# Patient Record
Sex: FEMALE | Race: ASIAN | NOT HISPANIC OR LATINO | Employment: STUDENT | URBAN - METROPOLITAN AREA
[De-identification: names, ages, dates, MRNs, and addresses within clinical notes are randomized per-mention and may not be internally consistent; named-entity substitution may affect disease eponyms.]

---
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
## 2023-02-18 ENCOUNTER — OFFICE VISIT (OUTPATIENT)
Dept: URGENT CARE | Facility: CLINIC | Age: 18
End: 2023-02-18

## 2023-02-18 VITALS
RESPIRATION RATE: 16 BRPM | HEART RATE: 110 BPM | OXYGEN SATURATION: 99 % | HEIGHT: 61 IN | TEMPERATURE: 98.2 F | BODY MASS INDEX: 23.79 KG/M2 | WEIGHT: 126 LBS

## 2023-02-18 DIAGNOSIS — U07.1 COVID-19: Primary | ICD-10-CM

## 2023-02-18 LAB
SARS-COV-2 AG UPPER RESP QL IA: POSITIVE
VALID CONTROL: ABNORMAL

## 2023-02-18 NOTE — PROGRESS NOTES
[facility] [facility]        NAME: Carmen Jenkins is a 21 y o  female  : 2005    MRN: [id number]  DATE: 2023  TIME: 4:14 PM    Assessment and Plan   COVID-19 [U07 1]  1  COVID-19  Poct Covid 19 Rapid Antigen Test            Patient Instructions     Patient Instructions   Rapid COVID test positive  To continue over-the-counter cough and cold medication, adequate fluid hydration and rest   Discussed isolation/quarantine requirements  Follow up with PCP in 3-5 days  Proceed to  ER if symptoms worsen  Chief Complaint     Chief Complaint   Patient presents with   • Cold Like Symptoms     Pt reports cough, sore throat and congestion, 3x days         History of Present Illness       Patient is a 70-year-old female presenting today with cold symptoms x3 days  Patient notes over the last few days she has been experiencing some nasal congestion, postnasal drip and a cough, has been taking over-the-counter Robitussin which has provided some relief of her symptoms, denies any known sick contacts  Denies fever, chills, chest tightness, SOB, N/V/D  Review of Systems   Review of Systems   Constitutional: Negative for chills, fatigue and fever  HENT: Positive for congestion, postnasal drip and sore throat  Eyes: Negative for redness and itching  Respiratory: Positive for cough  Negative for chest tightness and shortness of breath  Cardiovascular: Negative for chest pain  Gastrointestinal: Negative for diarrhea, nausea and vomiting  Musculoskeletal: Negative for arthralgias and myalgias  Neurological: Negative for light-headedness and headaches           Current Medications       Current Outpatient Medications:   •  EPINEPHrine (EPIPEN) 0 3 mg/0 3 mL SOAJ, Inject 0 3 mL (0 3 mg total) into a muscle once for 1 dose As needed for anaphylactic reaction to nuts, Disp: 0 3 mL, Rfl: 0  •  norgestimate-ethinyl estradiol (ORTHO-CYCLEN) 0 25-35 MG-MCG per tablet, Take 1 tablet by mouth daily, Disp: 168 tablet, Rfl: 1    Current Allergies     Allergies as of 02/18/2023 - Reviewed 02/18/2023   Allergen Reaction Noted   • Codeine Hives and Shortness Of Breath 12/08/2017   • Nuts - food allergy Anaphylaxis and Hives 03/22/2018   • Pistachio nut (diagnostic) - food allergy Anaphylaxis and Hives 12/08/2017            The following portions of the patient's history were reviewed and updated as appropriate: allergies, current medications, past family history, past medical history, past social history, past surgical history and problem list      Past Medical History:   Diagnosis Date   • No known health problems        Past Surgical History:   Procedure Laterality Date   • NO PAST SURGERIES         Family History   Problem Relation Age of Onset   • No Known Problems Mother    • Hypothyroidism Father         acquired   • Hyperlipidemia Father    • Diabetes Maternal Grandmother    • Stroke Maternal Grandfather    • Heart disease Paternal Grandfather    • Diabetes Maternal Aunt    • Diabetes Maternal Uncle    • Thyroid cancer Family          Medications have been verified  Objective   Pulse (!) 110   Temp 98 2 °F (36 8 °C)   Resp 16   Ht 5' 0 75" (1 543 m)   Wt 57 2 kg (126 lb)   SpO2 99%   BMI 24 00 kg/m²        Physical Exam     Physical Exam  Vitals and nursing note reviewed  Constitutional:       General: She is not in acute distress  Appearance: Normal appearance  HENT:      Head: Normocephalic  Right Ear: Tympanic membrane, ear canal and external ear normal       Left Ear: Tympanic membrane, ear canal and external ear normal       Nose: Congestion present  Mouth/Throat:      Mouth: Mucous membranes are moist       Pharynx: Oropharynx is clear  No oropharyngeal exudate or posterior oropharyngeal erythema  Eyes:      Conjunctiva/sclera: Conjunctivae normal    Cardiovascular:      Rate and Rhythm: Normal rate and regular rhythm  Pulses: Normal pulses        Heart sounds: Normal heart sounds  Pulmonary:      Effort: Pulmonary effort is normal       Breath sounds: Normal breath sounds  Lymphadenopathy:      Cervical: No cervical adenopathy  Skin:     General: Skin is warm  Capillary Refill: Capillary refill takes less than 2 seconds  Neurological:      Mental Status: She is alert

## 2023-02-18 NOTE — PATIENT INSTRUCTIONS
Rapid COVID test positive  To continue over-the-counter cough and cold medication, adequate fluid hydration and rest   Discussed isolation/quarantine requirements

## 2023-02-18 NOTE — LETTER
February 18, 2023     Patient: Martha Hennessy   YOB: 2005   Date of Visit: 2/18/2023       To Whom It May Concern: It is my medical opinion that Martha Hennessy may return to work on 02/21/2023  Please excuse her absence until this time  If you have any questions or concerns, please don't hesitate to call           Sincerely,        Carmen Lam PA-C    CC: No Recipients

## 2023-03-16 ENCOUNTER — CONSULT (OUTPATIENT)
Dept: DERMATOLOGY | Age: 18
End: 2023-03-16

## 2023-03-16 VITALS — BODY MASS INDEX: 23.71 KG/M2 | WEIGHT: 125.6 LBS | TEMPERATURE: 98.3 F | HEIGHT: 61 IN

## 2023-03-16 DIAGNOSIS — L68.0 HIRSUTISM: ICD-10-CM

## 2023-03-16 DIAGNOSIS — E28.2 PCOS (POLYCYSTIC OVARIAN SYNDROME): ICD-10-CM

## 2023-03-16 DIAGNOSIS — L21.9 SEBORRHEIC DERMATITIS: Primary | ICD-10-CM

## 2023-03-16 RX ORDER — KETOCONAZOLE 20 MG/ML
SHAMPOO TOPICAL
Qty: 120 ML | Refills: 1 | Status: SHIPPED | OUTPATIENT
Start: 2023-03-16

## 2023-03-16 RX ORDER — MOMETASONE FUROATE 1 MG/ML
SOLUTION TOPICAL DAILY
Qty: 60 ML | Refills: 1 | Status: SHIPPED | OUTPATIENT
Start: 2023-03-16

## 2023-03-16 NOTE — PROGRESS NOTES
[facility] Follow Up Note    Patient Name: Deb Vines  Encounter Date: [id number] [id number]    Today's Chief Concerns:  • Concern #1: Hirsutism   Current Medications:    Current Outpatient Medications:   •  norgestimate-ethinyl estradiol (ORTHO-CYCLEN) 0 25-35 MG-MCG per tablet, Take 1 tablet by mouth daily, Disp: 168 tablet, Rfl: 1  •  EPINEPHrine (EPIPEN) 0 3 mg/0 3 mL SOAJ, Inject 0 3 mL (0 3 mg total) into a muscle once for 1 dose As needed for anaphylactic reaction to nuts, Disp: 0 3 mL, Rfl: 0    CONSTITUTIONAL:   There were no vitals filed for this visit  Specific Alerts:    Have you been seen by a [facility]atologist in the last 3 years? No    Are you pregnant or planning to become pregnant? No    Are you currently or planning to be nursing or breast feeding? No    Allergies   Allergen Reactions   • Codeine Hives and Shortness Of Breath   • Nuts - Food Allergy Anaphylaxis and Hives   • Pistachio Nut (Diagnostic) - Food Allergy Anaphylaxis and Hives       May we call your Preferred Phone number to discuss your specific medical information? YES    May we leave a detailed message that includes your specific medical information? YES    Have you traveled outside of the [facility] in the past 3 months? No        Review of Systems:  Have you recently had or currently have any of the following?     · Fever or chills: No  · Night Sweats: No  · Headaches: No  · Weight Gain: No  · Weight Loss: No  · Blurry Vision: No  · Nausea: No  · Vomiting: No  · Diarrhea: No  · Blood in Stool: No  · Abdominal Pain: No  · Itchy Skin: No  · Painful Joints: No  · Swollen Joints: No  · Muscle Pain: No  · Irregular Mole: No  · Sun Burn: No  · Dry Skin: No  · Skin Color Changes: No  · Scar or Keloid: No  · Cold Sores/Fever Blisters: No  · Bacterial Infections/MRSA: No  · Anxiety: No  · Depression: No  · Suicidal or Homicidal Thoughts: No      PSYCH: Normal mood and affect  EYES: Normal conjunctiva  ENT: Normal lips and oral mucosa  CARDIOVASCULAR: No edema  RESPIRATORY: Normal respirations  HEME/LYMPH/IMMUNO:  No regional lymphadenopathy except as noted below in ASSESSMENT AND PLAN BY DIAGNOSIS    FULL ORGAN SYSTEM SKIN EXAM (SKIN)  Hair, Scalp, Ears, Face Normal except as noted below in Assessment   Neck, Cervical Chain Nodes Normal except as noted below in Assessment   Right Arm/Hand/Fingers Normal except as noted below in Assessment   Left Arm/Hand/Fingers Normal except as noted below in Assessment   Chest/Breasts/Axillae Viewed areas Normal except as noted below in Assessment   Abdomen, Umbilicus Normal except as noted below in Assessment   Back/Spine Normal except as noted below in Assessment   Groin/Genitalia/Buttocks Viewed areas Normal except as noted below in Assessment   Right Leg, Foot, Toes Normal except as noted below in Assessment   Left Leg, Foot, Toes Normal except as noted below in Assessment     SEBORRHEIC DERMATITIS    Physical Exam:  • Anatomic Location Affected:  Scalp   • Morphological Description:  Fine scale and few patches of erythema   • Pertinent Positives:  • Pertinent Negatives: Additional History of Present Condition:      Assessment and Plan:  Based on a thorough discussion of this condition and the management approach to it (including a comprehensive discussion of the known risks, side effects and potential benefits of treatment), the patient (family) agrees to implement the following specific plan:  • Start ketoconazole shampoo 2%   Daily for 2 weeks straight and then on "Mondays, Wednesdays and Fridays":  Lather into scalp and skin on face, neck, chest, and back; leave on for 5 minutes and then rinse off completely  • Start Mometasone 0 1 % lotion     Apply topically to scalp once a day       Seborrheic Dermatitis   Seborrheic dermatitis is a common, chronic or relapsing form of eczema/dermatitis that mainly affects the sebaceous, gland-rich regions of the scalp, face, and trunk  There are infantile and adult forms of seborrhoeic dermatitis  It is sometimes associated with psoriasis and, in that clinical scenario, may be referred to as "sebo-psoriasis "  Seborrheic dermatitis is also known as "seborrheic eczema "  Dandruff (also called "pityriasis capitis") is an uninflamed form of seborrhoeic dermatitis  Dandruff presents as bran-like scaly patches scattered within hair-bearing areas of the scalp  In an infant, this condition may be referred to as "cradle cap "  The cause of seborrheic dermatitis is not completely understood  It is associated with proliferation of various species of the skin commensal Malassezia, in its yeast (non-pathogenic) form  Its metabolites (such as the fatty acids oleic acid, malssezin, and indole-3-carbaldehyde) may cause an inflammatory reaction  Differences in skin barrier lipid content and function may account for individual presentations  Infantile Seborrheic Dermatitis  Infantile seborrheic dermatitis affects babies under the age of 1 months and usually resolves by 1012 months of age  Infantile seborrheic dermatitis causes "cradle cap" (diffuse, greasy scaling on scalp)  The rash may spread to affect armpit and groin folds (a type of "napkin dermatitis")  There may be associated salmon-pink colored patches that may flake or peel  The rash in this case is usually not especially itchy, so the baby often appears undisturbed by the rash, even when more generalized  Adult Seborrheic Dermatitis  Adult seborrheic dermatitis tends to begin in late adolescence; prevalence is greatest in young adults and in the elderly  It is more common in males than in females      The following factors are sometimes associated with severe adult seborrheic dermatitis:  • Oily skin  • Familial tendency to seborrhoeic dermatitis or a family history of psoriasis  • Immunosuppression: organ transplant recipient, human immunodeficiency virus (HIV) infection and patients with lymphoma  • Neurological and psychiatric diseases: Parkinson disease, tardive dyskinesia, depression, epilepsy, facial nerve palsy, spinal cord injury and congenital disorders such as Down syndrome  • Treatment for psoriasis with psoralen and ultraviolet A (PUVA) therapy  • Lack of sleep  • Stressful events  In adults, seborrheic dermatitis may typically affect the scalp, face (creases around the nose, behind ears, within eyebrows) and upper trunk  Typical clinical features include:  • Winter flares, improving in summer following sun exposure  • Minimal itch most of the time  • Combination oily and dry mid-facial skin  • Ill-defined localized scaly patches or diffuse scale in the scalp  • Blepharitis; scaly red eyelid margins  • Halfway-pink, thin, scaly, and ill-defined plaques in skin folds on both sides of the face  • Petal or ring-shaped flaky patches on hair-line and on anterior chest  • Rash in armpits, under the breasts, in the groin folds and genital creases  • Superficial folliculitis (inflamed hair follicles) on cheeks and upper trunk    Seborrheic dermatitis is diagnosed by its clinical appearance and behavior  Skin biopsy may be helpful but is rarely necessary to make this diagnosis  HIRSUTISM   Physical Exam:  • Anatomic Location Affected: Face  • Morphological Description:  Terminal hair growth  • Pertinent Positives:  • Pertinent Negatives:     Additional History of Present Condition:  Prefers no oral medication     Assessment and Plan:  Based on a thorough discussion of this condition and the management approach to it (including a comprehensive discussion of the known risks, side effects and potential benefits of treatment), the patient (family) agrees to implement the following specific plan:  • Discussed treatment options   • Referral sent for laser hair removal Dr Rob Duong,:  Manohar Burns am acting as a scribe while in the presence of the attending physician :       I,:  Claudette Andrews, MD personally performed the services described in this documentation    as scribed in my presence :

## 2023-03-16 NOTE — PATIENT INSTRUCTIONS
SEBORRHEIC DERMATITIS  Assessment and Plan:  Based on a thorough discussion of this condition and the management approach to it (including a comprehensive discussion of the known risks, side effects and potential benefits of treatment), the patient (family) agrees to implement the following specific plan:  Start ketoconazole shampoo 2%   Daily for 2 weeks straight and then on "Mondays, Wednesdays and Fridays":  Lather into scalp and skin on face, neck, chest, and back; leave on for 5 minutes and then rinse off completely  Start Mometasone 0 1 % lotion    Apply topically to scalp once a day       Seborrheic Dermatitis   Seborrheic dermatitis is a common, chronic or relapsing form of eczema/dermatitis that mainly affects the sebaceous, gland-rich regions of the scalp, face, and trunk  There are infantile and adult forms of seborrhoeic dermatitis  It is sometimes associated with psoriasis and, in that clinical scenario, may be referred to as "sebo-psoriasis "  Seborrheic dermatitis is also known as "seborrheic eczema "  Dandruff (also called "pityriasis capitis") is an uninflamed form of seborrhoeic dermatitis  Dandruff presents as bran-like scaly patches scattered within hair-bearing areas of the scalp  In an infant, this condition may be referred to as "cradle cap "  The cause of seborrheic dermatitis is not completely understood  It is associated with proliferation of various species of the skin commensal Malassezia, in its yeast (non-pathogenic) form  Its metabolites (such as the fatty acids oleic acid, malssezin, and indole-3-carbaldehyde) may cause an inflammatory reaction  Differences in skin barrier lipid content and function may account for individual presentations  Infantile Seborrheic Dermatitis  Infantile seborrheic dermatitis affects babies under the age of 1 months and usually resolves by 1012 months of age  Infantile seborrheic dermatitis causes "cradle cap" (diffuse, greasy scaling on scalp)   The rash may spread to affect armpit and groin folds (a type of "napkin dermatitis")  There may be associated salmon-pink colored patches that may flake or peel  The rash in this case is usually not especially itchy, so the baby often appears undisturbed by the rash, even when more generalized  Adult Seborrheic Dermatitis  Adult seborrheic dermatitis tends to begin in late adolescence; prevalence is greatest in young adults and in the elderly  It is more common in males than in females  The following factors are sometimes associated with severe adult seborrheic dermatitis:  Oily skin  Familial tendency to seborrhoeic dermatitis or a family history of psoriasis  Immunosuppression: organ transplant recipient, human immunodeficiency virus (HIV) infection and patients with lymphoma  Neurological and psychiatric diseases: Parkinson disease, tardive dyskinesia, depression, epilepsy, facial nerve palsy, spinal cord injury and congenital disorders such as Down syndrome  Treatment for psoriasis with psoralen and ultraviolet A (PUVA) therapy  Lack of sleep  Stressful events  In adults, seborrheic dermatitis may typically affect the scalp, face (creases around the nose, behind ears, within eyebrows) and upper trunk  Typical clinical features include: Winter flares, improving in summer following sun exposure  Minimal itch most of the time  Combination oily and dry mid-facial skin  Ill-defined localized scaly patches or diffuse scale in the scalp  Blepharitis; scaly red eyelid margins  Hollis-pink, thin, scaly, and ill-defined plaques in skin folds on both sides of the face  Petal or ring-shaped flaky patches on hair-line and on anterior chest  Rash in armpits, under the breasts, in the groin folds and genital creases  Superficial folliculitis (inflamed hair follicles) on cheeks and upper trunk    Seborrheic dermatitis is diagnosed by its clinical appearance and behavior   Skin biopsy may be helpful but is rarely necessary to make this diagnosis    HIRSUTISM   Assessment and Plan:  Based on a thorough discussion of this condition and the management approach to it (including a comprehensive discussion of the known risks, side effects and potential benefits of treatment), the patient (family) agrees to implement the following specific plan:  Discussed treatment options   Referral sent for laser hair removal Dr Mary Rodriguez

## 2023-04-28 ENCOUNTER — TELEPHONE (OUTPATIENT)
Dept: DERMATOLOGY | Facility: CLINIC | Age: 18
End: 2023-04-28

## 2023-05-04 ENCOUNTER — OFFICE VISIT (OUTPATIENT)
Dept: DERMATOLOGY | Facility: CLINIC | Age: 18
End: 2023-05-04

## 2023-05-04 ENCOUNTER — TELEPHONE (OUTPATIENT)
Dept: DERMATOLOGY | Facility: CLINIC | Age: 18
End: 2023-05-04

## 2023-05-04 VITALS — TEMPERATURE: 97.9 F | BODY MASS INDEX: 23.79 KG/M2 | HEIGHT: 61 IN | WEIGHT: 126 LBS

## 2023-05-04 DIAGNOSIS — L68.0 HIRSUTISM: ICD-10-CM

## 2023-05-04 DIAGNOSIS — L73.1 PSEUDOFOLLICULITIS BARBAE: Primary | ICD-10-CM

## 2023-05-04 NOTE — PATIENT INSTRUCTIONS
LASER-HAIR REDUCTION INSTRUCTIONS    Prep-Care (what to do before your appointment)    Area must be shaved 24 hours prior to your appointment  The closer the shave the better  For bikini services, dont shave the part where you want to keep the hair  No makeup/lotion/deodorant on the day of your appointment (on treatment area)  Stay out of direct sunlight for at least 3 days prior to your appointment (& 3 days after)  Do not use self-kary or spray tan products for at least 2 weeks before your treatment to avoid potential injury  Avoid drinking more than 2 alcoholic beverages 24 hours before your treatment   Avoid waxing/threading/tweezing in the area for at least 4 weeks  Shaving is ok! Post-Care    Redness & Bumps are normal   Immediately after your treatment, redness & bumps at the treatment area are common; these may last up to 2 hours or longer  It is normal for the treated area to feel like a sunburn for a few hours  You should use a cold compress if the sensitivity continues  If there is any crusting, apply an antibiotic cream  Darker pigmented skin may have more discomfort than lighter skin & may persist longer  Cleanse the area treated gently  The treated area may be washed gently with a mild soap  Skin should be patted dry & not rubbed during the first 48 hours  No makeup & lotion/moisturizer/deodorant for the first 24 hours  Keep the treated area clean & dry, if further redness or irritation persists, skip your makeup & moisturizer, & deodorant (for underarms) until the irritation has subsided  Dead hairs will begin to shed 5-30 days after your treatment  Stubble, representing dead hair being shed from the hair follicle, will appear within 5-30 days from the treatment date  that is normal & they will fall out quickly  Exfoliate to speed up hair shedding  Anywhere from 5-30 days after the treatment, shedding of the hair may occur & this may appear as new hair growth   It is not new hair growth, but the dead hair pushing its way out of the follicle  You can help the hair come out by using light exfoliation with a washcloth  & shaving  Avoid the sun  Avoid sun exposure to reduce the chance of dark or light spots for 2 months  Use sunscreen (spf 30 or higher) at all times throughout the treatment period & for 1-2 months following  Do not pick/scratch/wax/thread/tweeze the area  Avoid picking or scratching the treated skin  do not use any other hair removal methods or products, other than shaving, on the treated area during the course of your laser treatments, as it will prevent you from achieving the best results  Hair growth varies  On average, most individuals will be pleased with hair reduction after around 6, up to 9 sessions    Discussed out of pocket:    Laser Hair removal/Chin $125   Laser Hair removal Upper Lip $125  Laser Hair removal Jawline $200    Total $1800 buy 4, get 1 treatment   Total of 5 treatments

## 2023-05-04 NOTE — TELEPHONE ENCOUNTER
Start PA for hair removal CPT code 01489  One untit for 30 min increment ICD 10 code Dx: Pseudofolliculitis barbae O05 4  Should require 30 min per tx with 6-9 sessions to start

## 2023-05-04 NOTE — PROGRESS NOTES
"[facility]     Patient Name: Monse Hess  Encounter Date: 5/4/2023    Have you been cared for by a [place] [address] Dermatologist in the last 3 years and, if so, which description applies to you? Yes  I have been here within the last 3 years, and my medical history has NOT changed since that time  I am FEMALE/of child-bearing potential     REVIEW OF SYSTEMS:  Have you recently had or currently have any of the following? · No changes in my recent health  PAST MEDICAL HISTORY:  Have you personally ever had or currently have any of the following? If \"YES,\" then please provide more detail  · No changes in my medical history  FAMILY HISTORY:  Any \"first degree relatives\" (parent, brother, sister, or child) with the following?  No changes in my family's known health  PATIENT EXPERIENCE:     Do you want the Dermatologist to perform a COMPLETE skin exam today including a clinical examination under the \"bra and underwear\" areas? NO   If necessary, do we have your permission to call and leave a detailed message on your Preferred Phone number that includes your specific medical information? Yes      Allergies   Allergen Reactions    Codeine Hives and Shortness Of Breath    Nuts - Food Allergy Anaphylaxis and Hives    Pistachio Nut (Diagnostic) - Food Allergy Anaphylaxis and Hives      Current Outpatient Medications:     ketoconazole (NIZORAL) 2 % shampoo, Daily for 2 weeks straight and then on \"Mondays, Wednesdays and Fridays\":  Lather into scalp and skin on face, neck, chest, and back; leave on for 5 minutes and then rinse off completely  , Disp: 120 mL, Rfl: 1    norgestimate-ethinyl estradiol (ORTHO-CYCLEN) 0 25-35 MG-MCG per tablet, Take 1 tablet by mouth daily, Disp: 168 tablet, Rfl: 1    EPINEPHrine (EPIPEN) 0 3 mg/0 3 mL SOAJ, Inject 0 3 mL (0 3 mg total) into a muscle once for 1 dose As needed for anaphylactic reaction to nuts, Disp: 0 3 mL, Rfl: 0    mometasone (ELOCON) " 0 1 % lotion, Apply topically daily To scalp (Patient not taking: Reported on 5/4/2023), Disp: 60 mL, Rfl: 1           Whom besides the patient is providing clinical information about today's encounter?   o NO ADDITIONAL HISTORIAN (patient alone provided history)    Physical Exam and Assessment/Plan by Diagnosis:      LASER HAIR REMOVAL CONSULTATION FOR CHIN, JAWLINE  PSEUDOFOLLICULITIS BARBAE    Physical Exam:   Anatomic Location Affected:  Upper lip, jaw, upper neck and chin   Morphological Description:  Visible terminal hair growth on chin, upper lip, jaw, neck with perifollicular erythema and pigmentation   Pertinent Positives:   Pertinent Negatives: Additional History of Present Condition:  Patient presents to the office with mom  She wants to discuss laser hair removal  She complains of facial hair growth on upper lip, chin, and jawline area for about 3 years  Patient saw Dr Jeremi Gallegos ( Endocrinologist) and Dr Isaak Wu ( Dermatology) both recommended an office visit with Dr Alexis Joshi, who specializes in lasers treatment  Assessment and Plan:  Based on a thorough discussion of this condition and the management approach to it (including a comprehensive discussion of the known risks, side effects and potential benefits of treatment), the patient (family) agrees to implement the following specific plan:    Discussed to remember to use sun screen SPF 30+ daily  Recommended to purchase flawless finishing touch facial hair removal     Discussed out of pocket:    Laser Hair removal/Chin $125   Laser Hair removal Upper Lip $125  Laser Hair removal Jawline $200    Total $1800 for buy 4, get 1 treatment  Total of 5 treatments   Pre-operative instructions, expectations for [facility] discussed at length   [facility] to call insurance and ask if prior authorization is needed for [address] laser hair reduction for ICD 10 code for above noted condition    o If it is needed, please ask them for form    o If it is not needed, "please have them transfer you to the \"benefits line\" for information on copay and deductible  o  CPT code for [facility] is 53259, one unit for each 30 minute increment  ICD 10 codes as follows:  - Pseudofolliculitis barbae N20 9  o Should require 30 minutes per treatment with 6-9 sessions to start  Discussed touch up sessions likely to be required yearly   Test spots done today, Consent obtained, photo taken of areas tested with settings noted below:   o Wavelength used:  - Fluence:  9 00 J/cm2 (done on right chin), 8 done on left medial chin, 7 done on left lateral chin   - Pulse duration: 3 ms  - Spot size: 24 mm      LASER-HAIR REDUCTION INSTRUCTIONS    Prep-Care (what to do before your appointment)     Area must be shaved 24 hours prior to your appointment   The closer the shave the better   For bikini services, dont shave the part where you want to keep the hair   No makeup/lotion/deodorant on the day of your appointment (on treatment area)   Stay out of direct sunlight for at least 3 days prior to your appointment (& 3 days after)   Do not use self-kary or spray tan products for at least 2 weeks before your treatment to avoid potential injury   Avoid drinking more than 2 alcoholic beverages 24 hours before your treatment    Avoid waxing/threading/tweezing in the area for at least 4 weeks  Shaving is ok! Post-Care     Redness & Bumps are normal    Immediately after your treatment, redness & bumps at the treatment area are common; these may last up to 2 hours or longer  It is normal for the treated area to feel like a sunburn for a few hours  You should use a cold compress if the sensitivity continues  If there is any crusting, apply an antibiotic cream  Darker pigmented skin may have more discomfort than lighter skin & may persist longer   Cleanse the area treated gently   The treated area may be washed gently with a mild soap   Skin should be patted dry & not rubbed during the " first 48 hours   No makeup & lotion/moisturizer/deodorant for the first 24 hours   Keep the treated area clean & dry, if further redness or irritation persists, skip your makeup & moisturizer, & deodorant (for underarms) until the irritation has subsided   Dead hairs will begin to shed 5-30 days after your treatment   Stubble, representing dead hair being shed from the hair follicle, will appear within 5-30 days from the treatment date  that is normal & they will fall out quickly   Exfoliate to speed up hair shedding   Anywhere from 5-30 days after the treatment, shedding of the hair may occur & this may appear as new hair growth  It is not new hair growth, but the dead hair pushing its way out of the follicle  You can help the hair come out by using light exfoliation with a washcloth  & shaving   Avoid the sun   Avoid sun exposure to reduce the chance of dark or light spots for 2 months  Use sunscreen (spf 30 or higher) at all times throughout the treatment period & for 1-2 months following   Do not pick/scratch/wax/thread/tweeze the area   Avoid picking or scratching the treated skin  do not use any other hair removal methods or products, other than shaving, on the treated area during the course of your laser treatments, as it will prevent you from achieving the best results   Hair growth varies     On average, most individuals will be pleased with hair reduction after around 6, up to 9 sessions      Scribe Attestation    I,:  Eduardo Kelly am acting as a scribe while in the presence of the attending physician :       I,:  Timi Gonzales MD personally performed the services described in this documentation    as scribed in my presence :

## 2023-05-09 ENCOUNTER — TELEPHONE (OUTPATIENT)
Dept: FAMILY MEDICINE CLINIC | Facility: CLINIC | Age: 18
End: 2023-05-09

## 2023-05-11 NOTE — TELEPHONE ENCOUNTER
Pt's mother calling on status of PA    advised we have not received any response as of yet    She will callback is about a week and a half

## 2023-05-12 ENCOUNTER — TELEPHONE (OUTPATIENT)
Dept: DERMATOLOGY | Facility: CLINIC | Age: 18
End: 2023-05-12

## 2023-05-12 NOTE — TELEPHONE ENCOUNTER
Thank you Jeremiah William  I called insurance to initiate a PA  Spoke to a representative, Laura Brady, from [place] who stated that requested CPT code requires a prior authorization  She helped me to initiate an outpatient prior authorization with date of service 5/12/2023, under CPT code [phone], diagnosis code L73 1 ordering physician, Carol Gómez for 30 units  She suggested to fax clinical information proven medical necessity to fax# [phone]   Pending case ID# [id number]

## 2023-05-19 ENCOUNTER — TELEPHONE (OUTPATIENT)
Dept: FAMILY MEDICINE CLINIC | Facility: CLINIC | Age: 18
End: 2023-05-19

## 2023-05-19 NOTE — TELEPHONE ENCOUNTER
Form completed and placed for   Can we please let mother know  It asked for her COVID-19 vaccine dates and I don't have that information so they will have to complete that part

## 2023-05-19 NOTE — TELEPHONE ENCOUNTER
Patient mother dropped off form for school  She asks if possible to have ready today   Left in Dr Beka King

## 2023-05-23 ENCOUNTER — OFFICE VISIT (OUTPATIENT)
Dept: URGENT CARE | Facility: CLINIC | Age: 18
End: 2023-05-23

## 2023-05-23 VITALS
BODY MASS INDEX: 23.79 KG/M2 | TEMPERATURE: 98.4 F | WEIGHT: 126 LBS | RESPIRATION RATE: 18 BRPM | OXYGEN SATURATION: 100 % | HEART RATE: 86 BPM | HEIGHT: 61 IN
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
## 2023-05-23 DIAGNOSIS — S00.06XA TICK BITE OF SCALP, INITIAL ENCOUNTER: Primary | ICD-10-CM

## 2023-05-23 RX ORDER — DOXYCYCLINE 100 MG/1
200 CAPSULE ORAL ONCE
Qty: 2 CAPSULE | Refills: 0 | Status: SHIPPED | OUTPATIENT
Start: 2023-05-23 | End: 2023-05-23

## 2023-05-23 NOTE — PATIENT INSTRUCTIONS
Tick removed from scalp, will treat with one-time dose of doxycycline for prevention of infection  Follow-up with PCP

## 2023-05-23 NOTE — PROGRESS NOTES
[facility]        NAME: Kiesha Lazaro is a 21 y o  female  : 2005    MRN: [id number]  DATE: May 23, 2023  TIME: 6:15 PM    Assessment and Plan   Tick bite of scalp, initial encounter [S00 06XA, W57  XXXA]  1  Tick bite of scalp, initial encounter  doxycycline monohydrate (MONODOX) 100 mg capsule            Patient Instructions     Patient Instructions   Tick removed from scalp, will treat with one-time dose of doxycycline for prevention of infection  Follow-up with PCP  Follow up with PCP in 3-5 days  Proceed to  ER if symptoms worsen  Chief Complaint     Chief Complaint   Patient presents with   • Tick Removal     Tick embedded in scalp found it today         History of Present Illness       Patient is a 20-year-old female presenting today with tick bite of scalp x1 day  Patient notes this afternoon she noticed a tick on the back of her head, notes that they attempted to remove it but were unsuccessful, believes it has only been on for less than a day  Denies fever, arthralgias, rash, abdominal pain  Review of Systems   Review of Systems   Constitutional: Negative for chills and fever  HENT: Negative for ear pain and sore throat  Eyes: Negative for pain and visual disturbance  Respiratory: Negative for cough and shortness of breath  Cardiovascular: Negative for chest pain and palpitations  Gastrointestinal: Negative for abdominal pain and vomiting  Genitourinary: Negative for dysuria and hematuria  Musculoskeletal: Negative for arthralgias and back pain  Skin:        See HPI   Neurological: Negative for seizures and syncope  All other systems reviewed and are negative          Current Medications       Current Outpatient Medications:   •  doxycycline monohydrate (MONODOX) 100 mg capsule, Take 2 capsules (200 mg total) by mouth 1 (one) time for 1 dose, Disp: 2 capsule, Rfl: 0  •  EPINEPHrine (EPIPEN) 0 3 mg/0 3 mL SOAJ, Inject 0 3 mL (0 3 mg total) into a "muscle once for 1 dose As needed for anaphylactic reaction to nuts, Disp: 0 3 mL, Rfl: 0  •  norgestimate-ethinyl estradiol (ORTHO-CYCLEN) 0 25-35 MG-MCG per tablet, Take 1 tablet by mouth daily, Disp: 168 tablet, Rfl: 1  •  ketoconazole (NIZORAL) 2 % shampoo, Daily for 2 weeks straight and then on \"Mondays, Wednesdays and Fridays\":  Lather into scalp and skin on face, neck, chest, and back; leave on for 5 minutes and then rinse off completely  , Disp: 120 mL, Rfl: 1  •  mometasone (ELOCON) 0 1 % lotion, Apply topically daily To scalp (Patient not taking: Reported on 5/4/2023), Disp: 60 mL, Rfl: 1    Current Allergies     Allergies as of 05/23/2023 - Reviewed 05/23/2023   Allergen Reaction Noted   • Codeine Hives and Shortness Of Breath 12/08/2017   • Nuts - food allergy Anaphylaxis and Hives 03/22/2018   • Pistachio nut (diagnostic) - food allergy Anaphylaxis and Hives 12/08/2017            The following portions of the patient's history were reviewed and updated as appropriate: allergies, current medications, past family history, past medical history, past social history, past surgical history and problem list      Past Medical History:   Diagnosis Date   • 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, codeine   • No known health problems        Past Surgical History:   Procedure Laterality Date   • NO PAST SURGERIES         Family History   Problem Relation Age of Onset   • No Known Problems Mother    • Hypothyroidism Father         acquired   • Hyperlipidemia Father    • Diabetes Maternal Grandmother    • Stroke Maternal Grandfather    • Heart disease Paternal Grandfather    • Diabetes Maternal Aunt    • Diabetes Maternal Uncle    • Thyroid cancer Family          Medications have been verified  Objective   Pulse 86   Temp 98 4 °F (36 9 °C)   Resp 18   Ht 5' 1\" (1 549 m)   Wt 57 2 kg (126 lb)   SpO2 100%   BMI 23 81 kg/m²        Physical Exam     Physical Exam  Vitals and nursing note reviewed     Constitutional:  " Appearance: Normal appearance  HENT:      Head: Normocephalic  Comments: Small partially embedded tick of top of scalp, tick is not engorged, no surrounding erythema     Right Ear: External ear normal       Left Ear: External ear normal    Cardiovascular:      Rate and Rhythm: Normal rate  Pulses: Normal pulses  Pulmonary:      Effort: Pulmonary effort is normal    Skin:     General: Skin is warm  Capillary Refill: Capillary refill takes less than 2 seconds  Neurological:      Mental Status: She is alert

## 2023-05-27 ENCOUNTER — OFFICE VISIT (OUTPATIENT)
Dept: URGENT CARE | Facility: CLINIC | Age: 18
End: 2023-05-27
Payer: COMMERCIAL

## 2023-05-27 VITALS
HEIGHT: 61 IN | SYSTOLIC BLOOD PRESSURE: 100 MMHG | TEMPERATURE: 99 F | DIASTOLIC BLOOD PRESSURE: 60 MMHG | RESPIRATION RATE: 16 BRPM | BODY MASS INDEX: 23.98 KG/M2 | OXYGEN SATURATION: 99 % | HEART RATE: 96 BPM | WEIGHT: 127 LBS

## 2023-05-27 DIAGNOSIS — S16.1XXA STRAIN OF MUSCLE, FASCIA AND TENDON AT NECK LEVEL, INITIAL ENCOUNTER: Primary | ICD-10-CM

## 2023-05-27 PROCEDURE — 99213 OFFICE O/P EST LOW 20 MIN: CPT | Performed by: PHYSICIAN ASSISTANT

## 2023-05-27 RX ORDER — DOXYCYCLINE 100 MG/1
CAPSULE ORAL
COMMUNITY
Start: 2023-05-23 | End: 2023-06-01 | Stop reason: ALTCHOICE

## 2023-05-27 NOTE — PROGRESS NOTES
[facility]        NAME: Nancy Macdonald is a 21 y.o. female  : 2005    MRN: [id number]  DATE: May 27, 2023  TIME: 2:04 PM    Assessment and Plan   Strain of muscle, fascia and tendon at neck level, initial encounter [S16. 1XXA]  1. Strain of muscle, fascia and tendon at neck level, initial encounter              Patient Instructions     Strain of neck muscle  Warm compress  Tylenol/ibuprofen as needed for pain    Follow up with PCP in 3-5 days. Proceed to  ER if symptoms worsen. Chief Complaint     Chief Complaint   Patient presents with   • Swollen Glands     Right sided lymph node swelling started this morning. History of Present Illness       Lynsey Hernandes is a 68-year-old female who presents to clinic complaining of a lump on the right side of her neck as well as some neck pain. She was prescribed doxycycline last week for tick bite and today he is concerned that the lymph nodes on the side of her neck are inflamed. She denies any fever, chills, joint pain, or fatigue. She denies any injury or trauma to the neck. Review of Systems   Review of Systems   Constitutional: Negative for chills, fatigue and fever. Musculoskeletal: Positive for neck pain. Negative for arthralgias and neck stiffness. Neurological: Negative for headaches. Current Medications       Current Outpatient Medications:   •  doxycycline monohydrate (MONODOX) 100 mg capsule, TAKE 2 CAPSULES BY MOUTH AS A ONE-TIME DOSE, Disp: , Rfl:   •  EPINEPHrine (EPIPEN) 0.3 mg/0.3 mL SOAJ, Inject 0.3 mL (0.3 mg total) into a muscle once for 1 dose As needed for anaphylactic reaction to nuts, Disp: 0.3 mL, Rfl: 0  •  ketoconazole (NIZORAL) 2 % shampoo, Daily for 2 weeks straight and then on ",  and ":  Lather into scalp and skin on face, neck, chest, and back; leave on for 5 minutes and then rinse off completely. , Disp: 120 mL, Rfl: 1  •  mometasone (ELOCON) 0.1 % lotion, Apply topically daily To scalp (Patient not taking: Reported on 5/4/2023), Disp: 60 mL, Rfl: 1  •  norgestimate-ethinyl estradiol (ORTHO-CYCLEN) 0.25-35 MG-MCG per tablet, Take 1 tablet by mouth daily, Disp: 168 tablet, Rfl: 1    Current Allergies     Allergies as of 05/27/2023 - Reviewed 05/27/2023   Allergen Reaction Noted   • Codeine Hives and Shortness Of Breath 12/08/2017   • Nuts - food allergy Anaphylaxis and Hives 03/22/2018   • Pistachio nut (diagnostic) - food allergy Anaphylaxis and Hives 12/08/2017            The following portions of the patient's history were reviewed and updated as appropriate: allergies, current medications, past family history, past medical history, past social history, past surgical history and problem list.     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 Objective   /60   Pulse 96   Temp 99 °F (37.2 °C)   Resp 16   Ht 5' 1" (1.549 m)   Wt 57.6 kg (127 lb)   SpO2 99%   BMI 24.00 kg/m²   No LMP recorded. (Menstrual status: Birth Control). Physical Exam     Physical Exam  Vitals and nursing note reviewed. Constitutional:       General: She is not in acute distress. Appearance: Normal appearance. She is not ill-appearing. Cardiovascular:      Rate and Rhythm: Normal rate and regular rhythm. Heart sounds: Normal heart sounds. Pulmonary:      Effort: Pulmonary effort is normal.      Breath sounds: Normal breath sounds.    Musculoskeletal: Cervical back: No swelling, edema, deformity, erythema, signs of trauma, spasms, tenderness or bony tenderness. Pain with movement present. Decreased range of motion. Lymphadenopathy:      Cervical: No cervical adenopathy. Neurological:      Mental Status: She is alert and oriented to person, place, and time.    Psychiatric:         Mood and Affect: Mood normal.         Behavior: Behavior normal.

## 2023-06-01 ENCOUNTER — OFFICE VISIT (OUTPATIENT)
Dept: FAMILY MEDICINE CLINIC | Facility: CLINIC | Age: 18
End: 2023-06-01

## 2023-06-01 VITALS
SYSTOLIC BLOOD PRESSURE: 118 MMHG | TEMPERATURE: 99.5 F | RESPIRATION RATE: 16 BRPM | DIASTOLIC BLOOD PRESSURE: 80 MMHG | BODY MASS INDEX: 23.81 KG/M2 | HEART RATE: 108 BPM | WEIGHT: 126 LBS
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
## 2023-06-01 DIAGNOSIS — E78.5 HYPERLIPIDEMIA, UNSPECIFIED HYPERLIPIDEMIA TYPE: Primary | ICD-10-CM

## 2023-06-01 DIAGNOSIS — S00.06XD TICK BITE OF SCALP, SUBSEQUENT ENCOUNTER: ICD-10-CM

## 2023-06-01 DIAGNOSIS — R22.1 LUMP IN NECK: ICD-10-CM

## 2023-06-01 NOTE — PROGRESS NOTES
"Name: Audra Najera      : 2005      MRN: [id number]  Encounter Provider: Anna Cruz MD  Encounter Date: 2023   Encounter department: [address] [place] [address]     1  Hyperlipidemia, unspecified hyperlipidemia type  Comments:  Counseled on diet/exericse  Orders:  -     Lipid panel; Future  -     Comprehensive metabolic panel; Future  -     Lipid panel  -     Comprehensive metabolic panel    2  Tick bite of scalp, subsequent encounter  Comments:  Was given doxycycline one time dose for lyme prophylaxis at urgent care  Orders:  -     Lyme Disease Serology w/Reflex; Future  -     Lyme Disease Serology w/Reflex    3  Lump in neck  Comments:  Likely lymph node  For now monitor  If lasts > 6 weeks, will get imaging  Subjective      HPI   She noticed a lump on the right side of her neck about 5 days ago  No growth  It has gotten smaller in size  No pain  Went to the urgent care for this on 23 and was told it was a muscle pull  No other acute symptoms  She recently took one time doxycycline dose after tick bite  Review of Systems   Constitutional: Negative  HENT: Negative  Eyes: Negative  Respiratory: Negative  Cardiovascular: Negative  Gastrointestinal: Negative  Endocrine: Negative  Genitourinary: Negative  Musculoskeletal: Negative  Skin: Negative  Allergic/Immunologic: Negative  Neurological: Negative  Hematological: Negative  Psychiatric/Behavioral: Negative  Current Outpatient Medications on File Prior to Visit   Medication Sig   • EPINEPHrine (EPIPEN) 0 3 mg/0 3 mL SOAJ Inject 0 3 mL (0 3 mg total) into a muscle once for 1 dose As needed for anaphylactic reaction to nuts   • ketoconazole (NIZORAL) 2 % shampoo Daily for 2 weeks straight and then on \",  and \":  Lather into scalp and skin on face, neck, chest, and back; leave on for 5 minutes and then rinse off completely     • mometasone " (ELOCON) 0 1 % lotion Apply topically daily To scalp   • norgestimate-ethinyl estradiol (ORTHO-CYCLEN) 0 25-35 MG-MCG per tablet Take 1 tablet by mouth daily   • [DISCONTINUED] doxycycline monohydrate (MONODOX) 100 mg capsule TAKE 2 CAPSULES BY MOUTH AS A ONE-TIME DOSE (Patient not taking: Reported on 6/1/2023)       Objective     /80   Pulse (!) 108   Temp 99 5 °F (37 5 °C)   Resp 16   Wt 57 2 kg (126 lb)   LMP 05/04/2023 (Exact Date)   BMI 23 81 kg/m²     Physical Exam  Constitutional:       General: She is not in acute distress  Appearance: She is well-developed  She is not diaphoretic  HENT:      Head: Normocephalic and atraumatic  Cardiovascular:      Rate and Rhythm: Normal rate and regular rhythm  Heart sounds: Normal heart sounds  No murmur heard  No friction rub  No gallop  Pulmonary:      Effort: Pulmonary effort is normal  No respiratory distress  Breath sounds: Normal breath sounds  No wheezing or rales  Chest:      Chest wall: No tenderness  Musculoskeletal:         General: No deformity  Normal range of motion  Cervical back: Normal range of motion and neck supple  Lymphadenopathy:      Cervical: Cervical adenopathy (right sided lump in neck) present  Skin:     General: Skin is warm and dry  Neurological:      Mental Status: She is alert and oriented to person, place, and time  Psychiatric:         Behavior: Behavior normal          Thought Content:  Thought content normal          Judgment: Judgment normal        Ramona Clark MD

## 2023-06-14 ENCOUNTER — TELEPHONE (OUTPATIENT)
Age: 18
End: 2023-06-14

## 2023-06-14 NOTE — TELEPHONE ENCOUNTER
I spoke with the patient and she was notified  She received a letter from her insurance, so she was aware of the denial  She stated that she still wants to move forward with it  She has an appointment in August with Dr Fran Forbes

## 2023-06-14 NOTE — TELEPHONE ENCOUNTER
[facility] unable to process request: Services performed by out of network     Received by fax, scanned into chart

## 2023-07-13 ENCOUNTER — COSMETIC (OUTPATIENT)
Dept: DERMATOLOGY | Facility: CLINIC | Age: 18
End: 2023-07-13

## 2023-07-13 DIAGNOSIS — Z41.1 ENCOUNTER FOR COSMETIC LASER PROCEDURE: Primary | ICD-10-CM

## 2023-07-13 PROCEDURE — LHRCHIN4: Performed by: STUDENT IN AN ORGANIZED HEALTH CARE EDUCATION/TRAINING PROGRAM

## 2023-07-13 PROCEDURE — LHRLIP4: Performed by: STUDENT IN AN ORGANIZED HEALTH CARE EDUCATION/TRAINING PROGRAM

## 2023-07-13 PROCEDURE — LHRJAW4: Performed by: STUDENT IN AN ORGANIZED HEALTH CARE EDUCATION/TRAINING PROGRAM

## 2023-07-13 NOTE — PROGRESS NOTES
[facility] Note     Patient Name: Renaldo Cowden  Encounter Date: 7/13/23     Have you been cared for by a [facility] Dermatologist in the last 3 years and, if so, which description applies to you? Yes. I have been here within the last 3 years, and my medical history has NOT changed since that time. I am FEMALE/of child-bearing potential.    REVIEW OF SYSTEMS:  Have you recently had or currently have any of the following? · No changes in my recent health. PAST MEDICAL HISTORY:  Have you personally ever had or currently have any of the following? If "YES," then please provide more detail. · No changes in my medical history. FAMILY HISTORY:  Any "first degree relatives" (parent, brother, sister, or child) with the following? • No changes in my family's known health. PATIENT EXPERIENCE:    • Do you want the Dermatologist to perform a COMPLETE skin exam today including a clinical examination under the "bra and underwear" areas? NO  • If necessary, do we have your permission to call and leave a detailed message on your Preferred Phone number that includes your specific medical information? Yes      Allergies   Allergen Reactions   • Codeine Hives and Shortness Of Breath   • Nuts - Food Allergy Anaphylaxis and Hives   • Pistachio Nut (Diagnostic) - Food Allergy Anaphylaxis and Hives      Current Outpatient Medications:   •  EPINEPHrine (EPIPEN) 0.3 mg/0.3 mL SOAJ, Inject 0.3 mL (0.3 mg total) into a muscle once for 1 dose As needed for anaphylactic reaction to nuts, Disp: 0.3 mL, Rfl: 0  •  ketoconazole (NIZORAL) 2 % shampoo, Daily for 2 weeks straight and then on "Mondays, Wednesdays and Fridays":  Lather into scalp and skin on face, neck, chest, and back; leave on for 5 minutes and then rinse off completely. , Disp: 120 mL, Rfl: 1  •  mometasone (ELOCON) 0.1 % lotion, Apply topically daily To scalp, Disp: 60 mL, Rfl: 1  •  norgestimate-ethinyl estradiol (ORTHO-CYCLEN) 0.25-35 MG-MCG per tablet, Take 1 tablet by mouth daily, Disp: 168 tablet, Rfl: 1          • Whom besides the patient is providing clinical information about today's encounter?   o NO ADDITIONAL HISTORIAN (patient alone provided history)    Physical Exam and Assessment/Plan by Diagnosis:    Alexandrite Laser Treatment  7/13/2023    Device: Rosalba Gentle Max Pro  Place of Treatment: [facility]: Iovnne Best MD  Assistant: Kandy Lynn [facility]    Treatment number: 1  Site of treatment: Upper lip, Chin, and Jaw Line  Skin type: Hernandez III     Pre-operative Diagnosis:   Indications for Surgery:  Pseudofolliculitis barbae  Post-operative Diagnosis: same as pre-operative    Anesthetic: none     Safety Precautions:  Fire extinguisher persent/Window covered/Staff and patient eyes covered/Warning sign posted     Interim History/Comments:  none  Percent Improvement: N/A    Parameters: Laser was set to 755nm    Jaw line and Right neck  Fluence: 8 J/cm2  Pulse duration: 3 msec  Spot size: 24 mm    Upper Lip and mid neck ( due to sensitivity )  Fluence: 7 J/cm2  Pulse duration: 3 msec  Spot size: 24 mm    Pulse count: 88    Procedure Note:   After discussing potential procedure related risks including but not limited to pain, purpura, blistering, scarring, discoloration, “footprinting,” recurrence, inefficacy, need for additional treatment, and undesirable cosmetic results, written and verbal consent were obtained. Patient was brought back to the operating room. Time out was performed. Patient's name, identification and intended procedure were verified. Prior to the procedure, the patient cleansed the treatment area. The treatment area was re-cleansed with alcohol. Eye coverings eye shield inserted/placed. The cooling device was set to 50 msec msec pre / 40 msec delay     Tolerance: Patient tolerated the procedure well with no immediate significant complications and left in stable condition. Visible erythema post treatment. Complications: none  Other procedures: none  Photography: no    Post-op Care: Aftercare was discussed. Continue to ice at home and keep the area moist with a gentle moisturizer. Advised the patient to avoid exercise for the next 24 hours and also to be cautious with sun exposure over the next week. Advised patient to call the office if there is excessive swelling. Follow-up:  Patient is aware that it will take multiple treatments to treat this condition. Return to clinic for re-treatment in 4 - 6 weeks. Applied clobetasol ointment to areas treated. THIS WAS A COSMETIC TREATMENT AND PATIENT PAID OUT OF POCKET, DO NOT BILL INSURANCE. AMOUNT PAID: $1800.00 Paid in full. Total $1800 for buy 4, get 1 treatment. Total of 5 treatments.  Today is treatment number 1 of 5 treatments.         Scribe Attestation    I,:  Farhana Johnston MA am acting as a scribe while in the presence of the attending physician.:       I,:  Sanjay Fierro MD personally performed the services described in this documentation    as scribed in my presence.:

## 2023-07-19 ENCOUNTER — HOSPITAL ENCOUNTER (EMERGENCY)
Facility: HOSPITAL | Age: 18
Discharge: HOME | End: 2023-07-19
Attending: EMERGENCY MEDICINE
Payer: COMMERCIAL

## 2023-07-19 ENCOUNTER — APPOINTMENT (EMERGENCY)
Dept: RADIOLOGY | Facility: HOSPITAL | Age: 18
End: 2023-07-19
Payer: COMMERCIAL

## 2023-07-19 VITALS
SYSTOLIC BLOOD PRESSURE: 112 MMHG | DIASTOLIC BLOOD PRESSURE: 61 MMHG | RESPIRATION RATE: 17 BRPM | WEIGHT: 126 LBS | OXYGEN SATURATION: 100 % | TEMPERATURE: 98.2 F | HEART RATE: 89 BPM | BODY MASS INDEX: 23.81 KG/M2

## 2023-07-19 DIAGNOSIS — M79.602 LEFT ARM PAIN: ICD-10-CM

## 2023-07-19 DIAGNOSIS — R00.2 PALPITATIONS: Primary | ICD-10-CM

## 2023-07-19 LAB
ALBUMIN SERPL BCP-MCNC: 4.2 G/DL (ref 3.5–5)
ALP SERPL-CCNC: 79 U/L (ref 34–104)
ALT SERPL W P-5'-P-CCNC: 3 U/L (ref 7–52)
AMPHETAMINES SERPL QL SCN: NEGATIVE
ANION GAP SERPL CALCULATED.3IONS-SCNC: 10 MMOL/L
AST SERPL W P-5'-P-CCNC: 16 U/L (ref 13–39)
BARBITURATES UR QL: NEGATIVE
BASOPHILS # BLD AUTO: 0.02 THOUSANDS/ÂΜL (ref 0–0.1)
BASOPHILS NFR BLD AUTO: 0 % (ref 0–1)
BENZODIAZ UR QL: NEGATIVE
BILIRUB SERPL-MCNC: 0.22 MG/DL (ref 0.2–1)
BILIRUB UR QL STRIP: NEGATIVE
BUN SERPL-MCNC: 6 MG/DL (ref 5–25)
CALCIUM SERPL-MCNC: 8.9 MG/DL (ref 8.4–10.2)
CARDIAC TROPONIN I PNL SERPL HS: <2 NG/L
CHLORIDE SERPL-SCNC: 106 MMOL/L (ref 96–108)
CLARITY UR: CLEAR
CO2 SERPL-SCNC: 22 MMOL/L (ref 21–32)
COCAINE UR QL: NEGATIVE
COLOR UR: YELLOW
CREAT SERPL-MCNC: 0.64 MG/DL (ref 0.6–1.3)
D DIMER PPP FEU-MCNC: 0.44 UG/ML FEU
EOSINOPHIL # BLD AUTO: 0.07 THOUSAND/ÂΜL (ref 0–0.61)
EOSINOPHIL NFR BLD AUTO: 1 % (ref 0–6)
ERYTHROCYTE [DISTWIDTH] IN BLOOD BY AUTOMATED COUNT: 13.2 % (ref 11.6–15.1)
EXT PREGNANCY TEST URINE: NEGATIVE
EXT. CONTROL: NORMAL
GFR SERPL CREATININE-BSD FRML MDRD: 129 ML/MIN/1.73SQ M
GLUCOSE SERPL-MCNC: 103 MG/DL (ref 65–140)
GLUCOSE UR STRIP-MCNC: NEGATIVE MG/DL
HCT VFR BLD AUTO: 35.5 % (ref 34.8–46.1)
HGB BLD-MCNC: 12 G/DL (ref 11.5–15.4)
HGB UR QL STRIP.AUTO: NEGATIVE
IMM GRANULOCYTES # BLD AUTO: 0.02 THOUSAND/UL (ref 0–0.2)
IMM GRANULOCYTES NFR BLD AUTO: 0 % (ref 0–2)
KETONES UR STRIP-MCNC: NEGATIVE MG/DL
LEUKOCYTE ESTERASE UR QL STRIP: NEGATIVE
LYMPHOCYTES # BLD AUTO: 2.96 THOUSANDS/ÂΜL (ref 0.6–4.47)
LYMPHOCYTES NFR BLD AUTO: 31 % (ref 14–44)
MAGNESIUM SERPL-MCNC: 2 MG/DL (ref 1.9–2.7)
MCH RBC QN AUTO: 29.4 PG (ref 26.8–34.3)
MCHC RBC AUTO-ENTMCNC: 33.8 G/DL (ref 31.4–37.4)
MCV RBC AUTO: 87 FL (ref 82–98)
METHADONE UR QL: NEGATIVE
MONOCYTES # BLD AUTO: 0.65 THOUSAND/ÂΜL (ref 0.17–1.22)
MONOCYTES NFR BLD AUTO: 7 % (ref 4–12)
NEUTROPHILS # BLD AUTO: 5.75 THOUSANDS/ÂΜL (ref 1.85–7.62)
NEUTS SEG NFR BLD AUTO: 61 % (ref 43–75)
NITRITE UR QL STRIP: NEGATIVE
NRBC BLD AUTO-RTO: 0 /100 WBCS
OPIATES UR QL SCN: NEGATIVE
OXYCODONE+OXYMORPHONE UR QL SCN: NEGATIVE
PCP UR QL: NEGATIVE
PH UR STRIP.AUTO: 7 [PH]
PLATELET # BLD AUTO: 341 THOUSANDS/UL (ref 149–390)
PMV BLD AUTO: 10 FL (ref 8.9–12.7)
POTASSIUM SERPL-SCNC: 3.5 MMOL/L (ref 3.5–5.3)
PROT SERPL-MCNC: 7.9 G/DL (ref 6.4–8.4)
PROT UR STRIP-MCNC: NEGATIVE MG/DL
RBC # BLD AUTO: 4.08 MILLION/UL (ref 3.81–5.12)
SODIUM SERPL-SCNC: 138 MMOL/L (ref 135–147)
SP GR UR STRIP.AUTO: 1.01 (ref 1–1.03)
THC UR QL: NEGATIVE
UROBILINOGEN UR QL STRIP.AUTO: 0.2 E.U./DL
WBC # BLD AUTO: 9.47 THOUSAND/UL (ref 4.31–10.16)

## 2023-07-19 PROCEDURE — 80053 COMPREHEN METABOLIC PANEL: CPT | Performed by: EMERGENCY MEDICINE

## 2023-07-19 PROCEDURE — 71045 X-RAY EXAM CHEST 1 VIEW: CPT

## 2023-07-19 PROCEDURE — 81025 URINE PREGNANCY TEST: CPT | Performed by: EMERGENCY MEDICINE

## 2023-07-19 PROCEDURE — 84484 ASSAY OF TROPONIN QUANT: CPT | Performed by: EMERGENCY MEDICINE

## 2023-07-19 PROCEDURE — 81003 URINALYSIS AUTO W/O SCOPE: CPT | Performed by: EMERGENCY MEDICINE

## 2023-07-19 PROCEDURE — 93005 ELECTROCARDIOGRAM TRACING: CPT

## 2023-07-19 PROCEDURE — 85025 COMPLETE CBC W/AUTO DIFF WBC: CPT | Performed by: EMERGENCY MEDICINE

## 2023-07-19 PROCEDURE — 36415 COLL VENOUS BLD VENIPUNCTURE: CPT | Performed by: EMERGENCY MEDICINE

## 2023-07-19 PROCEDURE — 83735 ASSAY OF MAGNESIUM: CPT | Performed by: EMERGENCY MEDICINE

## 2023-07-19 PROCEDURE — 80307 DRUG TEST PRSMV CHEM ANLYZR: CPT | Performed by: EMERGENCY MEDICINE

## 2023-07-19 PROCEDURE — 85379 FIBRIN DEGRADATION QUANT: CPT | Performed by: EMERGENCY MEDICINE

## 2023-07-19 RX ORDER — KETOROLAC TROMETHAMINE 30 MG/ML
15 INJECTION, SOLUTION INTRAMUSCULAR; INTRAVENOUS ONCE
Status: COMPLETED | OUTPATIENT
Start: 2023-07-19 | End: 2023-07-19

## 2023-07-19 RX ADMIN — KETOROLAC TROMETHAMINE 15 MG: 30 INJECTION, SOLUTION INTRAMUSCULAR at 01:55

## 2023-07-19 RX ADMIN — SODIUM CHLORIDE 1000 ML: 0.9 INJECTION, SOLUTION INTRAVENOUS at 00:53

## 2023-07-19 NOTE — ED PROVIDER NOTES
History  Chief Complaint   Patient presents with   • Rapid Heart Rate     Patient reports had left arm pain started at 13:00, and rapid heart rate started 2 hours ago, also has sob, no history of cardiac problems     21year-old otherwise healthy female presents to the ED for evaluation of palpitation and left arm pain over the past few hours. Left arm pain initially started in the afternoon. Few hours prior to arrival patient felt palpitation and some mild chest pressure along with shortness of breath. Subsequently patient came to the ED for further evaluation. She does not take any medications on a daily basis except for birth control. Patient noted to be tachycardic in triage. Patient denies any fevers or chills. Patient denies any headaches, weakness, dizziness, or any focal neurodeficits. History provided by:  Patient   used: No    Rapid Heart Rate  Associated symptoms: no back pain, no chest pain, no cough, no shortness of breath and no vomiting        Prior to Admission Medications   Prescriptions Last Dose Informant Patient Reported? Taking? EPINEPHrine (EPIPEN) 0.3 mg/0.3 mL SOAJ   No No   Sig: Inject 0.3 mL (0.3 mg total) into a muscle once for 1 dose As needed for anaphylactic reaction to nuts   ketoconazole (NIZORAL) 2 % shampoo   No No   Sig: Daily for 2 weeks straight and then on "Mondays, Wednesdays and Fridays":  Lather into scalp and skin on face, neck, chest, and back; leave on for 5 minutes and then rinse off completely.    mometasone (ELOCON) 0.1 % lotion   No No   Sig: Apply topically daily To scalp   norgestimate-ethinyl estradiol (ORTHO-CYCLEN) 0.25-35 MG-MCG per tablet   No No   Sig: Take 1 tablet by mouth daily      Facility-Administered Medications: None       Past Medical History:   Diagnosis Date   • 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, codeine   • No known health problems        Past Surgical History:   Procedure Laterality Date   • NO PAST SURGERIES Family History   Problem Relation Age of Onset   • No Known Problems Mother    • Hypothyroidism Father         acquired   • Hyperlipidemia Father    • Diabetes Maternal Grandmother    • Stroke Maternal Grandfather    • Heart disease Paternal Grandfather    • Diabetes Maternal Aunt    • Diabetes Maternal Uncle    • Thyroid cancer Family      I have reviewed and agree with the history as documented. E-Cigarette/Vaping   • E-Cigarette Use Never User      E-Cigarette/Vaping Substances   • Nicotine No    • THC No    • CBD No    • Flavoring No      Social History     Tobacco Use   • Smoking status: Never   • Smokeless tobacco: Never   Vaping Use   • Vaping Use: Never used   Substance Use Topics   • Alcohol use: Never   • Drug use: Never       Review of Systems   Constitutional: Negative for chills and fever. HENT: Negative for ear pain and sore throat. Eyes: Negative for pain and visual disturbance. Respiratory: Negative for cough and shortness of breath. Cardiovascular: Positive for palpitations. Negative for chest pain. Gastrointestinal: Negative for abdominal pain and vomiting. Genitourinary: Negative for dysuria and hematuria. Musculoskeletal: Negative for arthralgias and back pain. Skin: Negative for color change and rash. Neurological: Negative for seizures and syncope. All other systems reviewed and are negative. Physical Exam  Physical Exam  Vitals and nursing note reviewed. Constitutional:       General: She is not in acute distress. Appearance: She is well-developed. HENT:      Head: Normocephalic and atraumatic. Eyes:      Conjunctiva/sclera: Conjunctivae normal.   Cardiovascular:      Rate and Rhythm: Regular rhythm. Tachycardia present. Heart sounds: No murmur heard. Pulmonary:      Effort: Pulmonary effort is normal. No respiratory distress. Breath sounds: Normal breath sounds. Abdominal:      Palpations: Abdomen is soft. Tenderness:  There is no abdominal tenderness. Musculoskeletal:         General: No swelling. Cervical back: Neck supple. Skin:     General: Skin is warm and dry. Capillary Refill: Capillary refill takes less than 2 seconds. Neurological:      Mental Status: She is alert. Psychiatric:         Mood and Affect: Mood normal.         Vital Signs  ED Triage Vitals   Temperature Pulse Respirations Blood Pressure SpO2   07/19/23 0043 07/19/23 0043 07/19/23 0043 07/19/23 0043 07/19/23 0043   98.2 °F (36.8 °C) (!) 127 18 146/97 99 %      Temp src Heart Rate Source Patient Position - Orthostatic VS BP Location FiO2 (%)   -- 07/19/23 0043 07/19/23 0130 07/19/23 0043 --    Monitor Sitting Right arm       Pain Score       07/19/23 0043       4           Vitals:    07/19/23 0043 07/19/23 0130 07/19/23 0200   BP: 146/97 104/60 112/61   Pulse: (!) 127 89 89   Patient Position - Orthostatic VS:  Sitting Sitting         Visual Acuity      ED Medications  Medications   sodium chloride 0.9 % bolus 1,000 mL (0 mL Intravenous Stopped 7/19/23 0220)   ketorolac (TORADOL) injection 15 mg (15 mg Intravenous Given 7/19/23 0155)       Diagnostic Studies  Results Reviewed     Procedure Component Value Units Date/Time    UA w Reflex to Microscopic w Reflex to Culture [[id number]] Collected: 07/19/23 0153    Lab Status: Final result Specimen: Urine, Clean Catch Updated: 07/19/23 0207     Color, UA Yellow     Clarity, UA Clear     Specific Gravity, UA 1.010     pH, UA 7.0     Leukocytes, UA Negative     Nitrite, UA Negative     Protein, UA Negative mg/dl      Glucose, UA Negative mg/dl      Ketones, UA Negative mg/dl      Urobilinogen, UA 0.2 E.U./dl      Bilirubin, UA Negative     Occult Blood, UA Negative    Rapid drug screen, urine [[id number]] Collected: 07/19/23 0152    Lab Status:  In process Specimen: Urine, Clean Catch Updated: 07/19/23 0204    POCT pregnancy, urine [[id number]]  (Normal) Resulted: 07/19/23 0154    Lab Status: Final result Updated: 07/19/23 0154     EXT Preg Test, Ur Negative     Control Valid    HS Troponin 0hr (reflex protocol) [[id number]]  (Normal) Collected: 07/19/23 0051    Lab Status: Final result Specimen: Blood from Arm, Left Updated: 07/19/23 0132     hs TnI 0hr <2 ng/L     HS Troponin I 2hr [[id number]]     Lab Status: No result Specimen: Blood     Comprehensive metabolic panel [[id number]]  (Abnormal) Collected: 07/19/23 0051    Lab Status: Final result Specimen: Blood from Arm, Left Updated: 07/19/23 0123     Sodium 138 mmol/L      Potassium 3.5 mmol/L      Chloride 106 mmol/L      CO2 22 mmol/L      ANION GAP 10 mmol/L      BUN 6 mg/dL      Creatinine 0.64 mg/dL      Glucose 103 mg/dL      Calcium 8.9 mg/dL      AST 16 U/L      ALT 3 U/L      Alkaline Phosphatase 79 U/L      Total Protein 7.9 g/dL      Albumin 4.2 g/dL      Total Bilirubin 0.22 mg/dL      eGFR 129 ml/min/1.73sq m     Narrative:      [place] guidelines for Chronic Kidney Disease (CKD):   •  Stage 1 with normal or high GFR (GFR > 90 mL/min/1.73 square meters)  •  Stage 2 Mild CKD (GFR = 60-89 mL/min/1.73 square meters)  •  Stage 3A Moderate CKD (GFR = 45-59 mL/min/1.73 square meters)  •  Stage 3B Moderate CKD (GFR = 30-44 mL/min/1.73 square meters)  •  Stage 4 Severe CKD (GFR = 15-29 mL/min/1.73 square meters)  •  Stage 5 End Stage CKD (GFR <15 mL/min/1.73 square meters)  Note: GFR calculation is accurate only with a steady state creatinine    Magnesium [[id number]]  (Normal) Collected: 07/19/23 0051    Lab Status: Final result Specimen: Blood from Arm, Left Updated: 07/19/23 0123     Magnesium 2.0 mg/dL     D-Dimer [[id number]]  (Normal) Collected: 07/19/23 0051    Lab Status: Final result Specimen: Blood from Arm, Left Updated: 07/19/23 0120     D-Dimer, Quant 0.44 ug/ml FEU     CBC and differential [[id number]] Collected: 07/19/23 0051    Lab Status: Final result Specimen: Blood from Arm, Left Updated: 07/19/23 0106     WBC 9.47 Thousand/uL RBC 4.08 Million/uL      Hemoglobin 12.0 g/dL      Hematocrit 35.5 %      MCV 87 fL      MCH 29.4 pg      MCHC 33.8 g/dL      RDW 13.2 %      MPV 10.0 fL      Platelets 821 Thousands/uL      nRBC 0 /100 WBCs      Neutrophils Relative 61 %      Immat GRANS % 0 %      Lymphocytes Relative 31 %      Monocytes Relative 7 %      Eosinophils Relative 1 %      Basophils Relative 0 %      Neutrophils Absolute 5.75 Thousands/µL      Immature Grans Absolute 0.02 Thousand/uL      Lymphocytes Absolute 2.96 Thousands/µL      Monocytes Absolute 0.65 Thousand/µL      Eosinophils Absolute 0.07 Thousand/µL      Basophils Absolute 0.02 Thousands/µL                  XR chest 1 view portable   ED Interpretation by Nia Rausch DO (07/19 0156)   No acute abnormalities noted. Procedures  ECG 12 Lead Documentation Only    Date/Time: 7/19/2023 12:48 AM    Performed by: Nia Rausch DO  Authorized by: Nia Rausch DO    Indications / Diagnosis:  Palpitation  ECG reviewed by me, the ED Provider: yes    Patient location:  ED  Previous ECG:     Previous ECG:  Unavailable    Comparison to cardiac monitor: Yes    Interpretation:     Interpretation: abnormal    Comments:      Sinus rhythm, rate 118, normal axis, normal intervals, no acute ST/T wave abnormalities noted, sinus tachycardia noted, no previous EKG available for comparison. ED Course  ED Course as of 07/19/23 0220 Wed Jul 19, 2023 0216 Patient reexamined at bedside. Patient started to feel better with IV fluids and medications. EDGARFFT    Flowsheet Row Most Recent Value   [facility]: During the past 12 months, did you:    1. Drink any alcohol (more than a few sips)? No Filed at: 07/19/2023 0047   2. Smoke any marijuana or hashish No Filed at: 07/19/2023 0047   3. Use anything else to get high? ("anything else" includes illegal drugs, over the counter and prescription drugs, and things that you sniff or 'storey')?  No Filed at: 07/19/2023 0047          HEART Risk Score    Flowsheet Row Most Recent Value   Heart Score Risk Calculator    History 0 Filed at: 07/19/2023 0204   ECG 0 Filed at: 07/19/2023 8312   Age 0 Filed at: 07/19/2023 0204   Risk Factors 0 Filed at: 07/19/2023 0204   Troponin 0 Filed at: 07/19/2023 0204   HEART Score 0 Filed at: 07/19/2023 0204                                      Medical Decision Making  Obtain blood work, EKG, D-dimer, troponin, chest x-ray  Give IV fluids, Toradol and continue to monitor patient for any worsening symptoms    Patient's EKG, chest x-ray, troponin, D-dimer as well as all of the blood work was unremarkable. Patient started to feel better with IV fluids and Toradol in the ED. Heart rate improved after IV fluids were given. At this time the etiology of patient's palpitation is unclear. Patient's left arm pain is most likely musculoskeletal in origin as patient stated to me that she mostly lays on her left side. Patient states that she eats a lot of spicy food and was wondering whether or chest pressure today is secondary to reflux. Patient told to take over-the-counter Pepcid to try and see if it helps with her symptoms. At this time patient is discharged home with follow-up to PCP as well as cardiology. Close return instructions given to return to the ER for any worsening symptoms. Patient agrees with discharge plan. Patient well appearing at time of discharge. Please Note: Fluency Direct voice recognition software may have been used in the creation of this document. Wrong words or sound a like substitutions may have occurred due to the inherent limitations of the voice software. Amount and/or Complexity of Data Reviewed  Labs: ordered. Decision-making details documented in ED Course. Radiology: ordered and independent interpretation performed. Decision-making details documented in ED Course. ECG/medicine tests: ordered and independent interpretation performed. Decision-making details documented in ED Course. Risk  Prescription drug management. Disposition  Final diagnoses:   Palpitations   Left arm pain     Time reflects when diagnosis was documented in both MDM as applicable and the Disposition within this note     Time User Action Codes Description Comment    7/19/2023  2:03 AM Nicola Carrasco Add [R00.2] Palpitations     7/19/2023  2:03 AM Nicola Carrasco Add [M79.602] Left arm pain       ED Disposition     ED Disposition   Discharge    Condition   Stable    Date/Time   Wed Jul 19, 2023  2:04 AM    Comment   Alexandrea Foreman discharge to home/self care. Follow-up Information     Follow up With Specialties Details Why Contact Info Additional Information    Tea Pfeiffer MD Family Medicine In 2 days  [address] [address]  [phone]       [facility] Schedule an appointment as soon as possible for a visit  If symptoms worsen [address] [facility] [address] [facility], [address] [place] [address] [address], [facility], [address] [phone]          Patient's Medications   Discharge Prescriptions    No medications on file       No discharge procedures on file.     PDMP Review     None          ED Provider  Electronically Signed by           Nicola Carrasco DO  07/19/23 [address] [facility],   07/19/23 0915

## 2023-07-19 NOTE — DISCHARGE INSTRUCTIONS
Please take a list of all of your medications and discharge paperwork with you to all of your follow-up medical visits. Please take all of your medications as directed. Please call your family doctor or return to the ER if you have increased shortness of breath, chest pain, fevers, chills, nausea, vomiting, diarrhea, or any other worsening symptoms.

## 2023-07-21 LAB
ALBUMIN SERPL-MCNC: 4.4 G/DL (ref 4–5)
ALBUMIN/GLOB SERPL: 1.5 {RATIO} (ref 1.2–2.2)
ALP SERPL-CCNC: 84 IU/L (ref 42–106)
ALT SERPL-CCNC: 3 IU/L (ref 0–32)
AST SERPL-CCNC: 16 IU/L (ref 0–40)
ATRIAL RATE: 118 BPM
B BURGDOR IGG+IGM SER QL IA: NEGATIVE
BILIRUB SERPL-MCNC: 0.2 MG/DL (ref 0–1.2)
BUN SERPL-MCNC: 7 MG/DL (ref 6–20)
BUN/CREAT SERPL: 9 (ref 9–23)
CALCIUM SERPL-MCNC: 9.7 MG/DL (ref 8.7–10.2)
CHLORIDE SERPL-SCNC: 105 MMOL/L (ref 96–106)
CHOLEST SERPL-MCNC: 238 MG/DL (ref 100–169)
CHOLEST/HDLC SERPL: 3.3 RATIO (ref 0–4.4)
CO2 SERPL-SCNC: 18 MMOL/L (ref 20–29)
CREAT SERPL-MCNC: 0.74 MG/DL (ref 0.57–1)
EGFR: 119 ML/MIN/1.73
GLOBULIN SER-MCNC: 2.9 G/DL (ref 1.5–4.5)
GLUCOSE SERPL-MCNC: 79 MG/DL (ref 70–99)
HDLC SERPL-MCNC: 72 MG/DL
LDLC SERPL CALC-MCNC: 139 MG/DL (ref 0–109)
MICRODELETION SYND BLD/T FISH: NORMAL
P AXIS: 84 DEGREES
POTASSIUM SERPL-SCNC: 4.2 MMOL/L (ref 3.5–5.2)
PR INTERVAL: 126 MS
PROT SERPL-MCNC: 7.3 G/DL (ref 6–8.5)
QRS AXIS: 72 DEGREES
QRSD INTERVAL: 76 MS
QT INTERVAL: 322 MS
QTC INTERVAL: 451 MS
SL AMB VLDL CHOLESTEROL CALC: 27 MG/DL (ref 5–40)
SODIUM SERPL-SCNC: 140 MMOL/L (ref 134–144)
T WAVE AXIS: 33 DEGREES
TRIGL SERPL-MCNC: 152 MG/DL (ref 0–89)
VENTRICULAR RATE: 118 BPM

## 2023-07-21 PROCEDURE — 93010 ELECTROCARDIOGRAM REPORT: CPT | Performed by: INTERNAL MEDICINE

## 2023-07-22 ENCOUNTER — TELEPHONE (OUTPATIENT)
Dept: FAMILY MEDICINE CLINIC | Facility: CLINIC | Age: 18
End: 2023-07-22

## 2023-07-22 NOTE — TELEPHONE ENCOUNTER
Called Sixto's mom Anh to discuss results.  [facility] Nursing Suicide Assessment Note - Inpatient    Current assessment:    Current C-SSRS score: Negative screen= no ideation, behaviors or history      Protective Factors / Reason for Living: Responsibility to children    Interventions:   · Maintain current plan of care.    Pt continues on suicide precautions, q 15 minute checks. Pt plans to go to group programming today. Pt denies suicidal ideations at this time. Pt does report general homicidal ideations, no specific target. Pt agrees to seek out staff if feeling unsafe. Pt up on unit, polite and cooperative. Pt refused morning Seroquel dose. No further needs for nursing at this time, writer will continue to monitor.

## 2023-07-22 NOTE — TELEPHONE ENCOUNTER
Patient's mom is calling asking for bloodwork results. She wanted to come in to discuss them with you. Has an apt next week.   I stated Dr. Bang Tran would call her back     Thank you

## 2023-07-27 ENCOUNTER — OFFICE VISIT (OUTPATIENT)
Dept: FAMILY MEDICINE CLINIC | Facility: CLINIC | Age: 18
End: 2023-07-27
Payer: COMMERCIAL

## 2023-07-27 VITALS
DIASTOLIC BLOOD PRESSURE: 70 MMHG | RESPIRATION RATE: 16 BRPM | BODY MASS INDEX: 23.79 KG/M2 | HEART RATE: 89 BPM | SYSTOLIC BLOOD PRESSURE: 110 MMHG | TEMPERATURE: 98.1 F | WEIGHT: 126 LBS | HEIGHT: 61 IN

## 2023-07-27 DIAGNOSIS — E78.5 HYPERLIPIDEMIA, UNSPECIFIED HYPERLIPIDEMIA TYPE: Primary | ICD-10-CM

## 2023-07-27 PROCEDURE — 99214 OFFICE O/P EST MOD 30 MIN: CPT | Performed by: FAMILY MEDICINE

## 2023-07-27 NOTE — ASSESSMENT & PLAN NOTE
Lab Results   Component Value Date    CHOLESTEROL 238 (H) 07/20/2023    CHOLESTEROL 229 (H) 11/09/2022    CHOLESTEROL 204 (H) 02/19/2022     Lab Results   Component Value Date    HDL 72 07/20/2023    HDL 57 11/09/2022    HDL 53 02/19/2022     Lab Results   Component Value Date    TRIG 152 (H) 07/20/2023    TRIG 244 (H) 11/09/2022    TRIG 190 (H) 02/19/2022     No results found for: "NONHDLC"     Stable. Continue diet/exercise.

## 2023-07-27 NOTE — PROGRESS NOTES
Name: Estevan Sifuentes      : 2005      MRN: [id number]  Encounter Provider: Edgar Javier MD  Encounter Date: 2023   Encounter department: 2 [facility]     1. Hyperlipidemia, unspecified hyperlipidemia type  Assessment & Plan:  Lab Results   Component Value Date    CHOLESTEROL 238 (H) 2023    CHOLESTEROL 229 (H) 2022    CHOLESTEROL 204 (H) 2022     Lab Results   Component Value Date    HDL 72 2023    HDL 57 2022    HDL 53 2022     Lab Results   Component Value Date    TRIG 152 (H) 2023    TRIG 244 (H) 2022    TRIG 190 (H) 2022     No results found for: "NONHDLC"     Stable. Continue diet/exercise. Subjective      HPI   Tmo Dean is here today accompanied by her mother to review blood work. She has no acute issues/concerns. Mother is wondering about her stopping birth control pill. They will discuss this with her gyn. She has been eating healthy and exercising daily. Review of Systems   Constitutional: Negative. HENT: Negative. Eyes: Negative. Respiratory: Negative. Cardiovascular: Negative. Gastrointestinal: Negative. Endocrine: Negative. Genitourinary: Negative. Musculoskeletal: Negative. Skin: Negative. Allergic/Immunologic: Negative. Neurological: Negative. Hematological: Negative. Psychiatric/Behavioral: Negative. Current Outpatient Medications on File Prior to Visit   Medication Sig   • EPINEPHrine (EPIPEN) 0.3 mg/0.3 mL SOAJ Inject 0.3 mL (0.3 mg total) into a muscle once for 1 dose As needed for anaphylactic reaction to nuts   • ketoconazole (NIZORAL) 2 % shampoo Daily for 2 weeks straight and then on ",  and ":  Lather into scalp and skin on face, neck, chest, and back; leave on for 5 minutes and then rinse off completely.    • norgestimate-ethinyl estradiol (ORTHO-CYCLEN) 0.25-35 MG-MCG per tablet Take 1 tablet by mouth daily   • [DISCONTINUED] mometasone (ELOCON) 0.1 % lotion Apply topically daily To scalp (Patient not taking: Reported on 7/27/2023)       Objective     /70   Pulse 89   Temp 98.1 °F (36.7 °C) (Tympanic)   Resp 16   Ht 5' 1" (1.549 m)   Wt 57.2 kg (126 lb)   LMP 07/04/2023   BMI 23.81 kg/m²     Physical Exam  Constitutional:       General: She is not in acute distress. Appearance: She is well-developed. She is not diaphoretic. HENT:      Head: Normocephalic and atraumatic. Cardiovascular:      Rate and Rhythm: Normal rate and regular rhythm. Heart sounds: Normal heart sounds. No murmur heard. No friction rub. No gallop. Pulmonary:      Effort: Pulmonary effort is normal. No respiratory distress. Breath sounds: Normal breath sounds. No wheezing or rales. Chest:      Chest wall: No tenderness. Musculoskeletal:         General: No deformity. Normal range of motion. Cervical back: Normal range of motion and neck supple. Skin:     General: Skin is warm and dry. Neurological:      Mental Status: She is alert and oriented to person, place, and time. Psychiatric:         Behavior: Behavior normal.         Thought Content:  Thought content normal.         Judgment: Judgment normal.       Mario Alberto Nunn MD

## 2023-08-03 ENCOUNTER — COSMETIC (OUTPATIENT)
Dept: DERMATOLOGY | Facility: CLINIC | Age: 18
End: 2023-08-03

## 2023-08-03 DIAGNOSIS — L81.0 POST-INFLAMMATORY HYPERPIGMENTATION: Primary | ICD-10-CM

## 2023-08-03 RX ORDER — HYDROQUINONE 40 MG/G
CREAM TOPICAL
Qty: 30 G | Refills: 3 | Status: SHIPPED | OUTPATIENT
Start: 2023-08-03

## 2023-08-03 NOTE — PROGRESS NOTES
[facility] Note     Patient Name: Joelle Irving  Encounter Date: 8/3/23     Have you been cared for by a [facility] Dermatologist in the last 3 years and, if so, which description applies to you? Yes. I have been here within the last 3 years, and my medical history has NOT changed since that time. I am FEMALE/of child-bearing potential.    REVIEW OF SYSTEMS:  Have you recently had or currently have any of the following? · No changes in my recent health. PAST MEDICAL HISTORY:  Have you personally ever had or currently have any of the following? If "YES," then please provide more detail. · No changes in my medical history. FAMILY HISTORY:  Any "first degree relatives" (parent, brother, sister, or child) with the following? • No changes in my family's known health. PATIENT EXPERIENCE:    • Do you want the Dermatologist to perform a COMPLETE skin exam today including a clinical examination under the "bra and underwear" areas? NO  • If necessary, do we have your permission to call and leave a detailed message on your Preferred Phone number that includes your specific medical information? Yes      Allergies   Allergen Reactions   • Codeine Hives and Shortness Of Breath   • Nuts - Food Allergy Anaphylaxis and Hives   • Pistachio Nut (Diagnostic) - Food Allergy Anaphylaxis and Hives      Current Outpatient Medications:   •  EPINEPHrine (EPIPEN) 0.3 mg/0.3 mL SOAJ, Inject 0.3 mL (0.3 mg total) into a muscle once for 1 dose As needed for anaphylactic reaction to nuts, Disp: 0.3 mL, Rfl: 0  •  ketoconazole (NIZORAL) 2 % shampoo, Daily for 2 weeks straight and then on "Mondays, Wednesdays and Fridays":  Lather into scalp and skin on face, neck, chest, and back; leave on for 5 minutes and then rinse off completely. , Disp: 120 mL, Rfl: 1  •  norgestimate-ethinyl estradiol (ORTHO-CYCLEN) 0.25-35 MG-MCG per tablet, Take 1 tablet by mouth daily, Disp: 168 tablet, Rfl: 1          • Whom besides the patient is providing clinical information about today's encounter?   o NO ADDITIONAL HISTORIAN (patient alone provided history)    Physical Exam and Assessment/Plan by Diagnosis:     Nd:YAG Laser Treatment    8/3/2023  Device: VendRxza Seals Gentle Max Pro  Place of Treatment: [facility]: Karlie Raman MD  Assistant: Rob Franco, [facility]    Treatment number: 2  Site of treatment: Upper lip, Chin, and Jaw Line  Skin type: Hernandez III    Pre-operative Diagnosis: Pseudofolliculitis barbae  Indications for Surgery:    Post-operative Diagnosis: same as pre-operative    Anesthetic: none     Safety Precautions:  Fire extinguisher persent/Window covered/Staff and patient eyes covered/Warning sign posted     Interim History/Comments:  one small area that was extra pink and has some darkening around it  Percent Improvement: N/A    Parameters: Laser was set to 1064nm    Fluence: 20 J/cm2  Pulse duration: 10 msec  Spot size: 18 mm  Pulse count: 62     Procedure Note:   After discussing potential procedure related risks including but not limited to pain, purpura, blistering, scarring, discoloration, “footprinting,” recurrence, inefficacy, need for additional treatment, and undesirable cosmetic results, written and verbal consent were obtained. Patient was brought back to the operating room. Time out was performed. Patient's name, identification and intended procedure were verified. Prior to the procedure, the patient cleansed the treatment area. The treatment area was re-cleansed with alcohol. Eye coverings eye shield inserted/placed. The cooling device was set to 40 msec msec pre / 20 msec delay     Tolerance: Patient tolerated the procedure well with no immediate significant complications and left in stable condition. Complications: One area on right sideburn with two visible subepidermal ingrown hairs with more pain on treating overlying skin- immediately applied clobetasol.  Otherwise perifollicular edema and visible erythema. Avoided area on left neck that had some pigmentation. Other procedures: none  Photography: no    Post-op Care: Aftercare was discussed. Continue to ice at home and keep the area moist with a gentle moisturizer. Advised the patient to avoid exercise for the next 24 hours and also to be cautious with sun exposure over the next week. Advised patient to call the office if there is excessive swelling. Follow-up:  Patient is aware that it will take multiple treatments to treat this condition. Return to clinic for re-treatment in 4-6 weeks. Sent in hydroquinone 10%/absorbic acid cream to [facility] to start once nightly. Side effects of hydroquinone reviewed including, but not limited to, erythema, irritation, paradoxical ochronsis and innefficacy. Stressed importance of strict photoprotection and photo avoidance. Apply Hydroquinone compounded cream to entire face nightly for 3 months, stop for 1 month, repeat    You should be contacted within a few days by the pharmacy as discussed via phone, text, or email. They will take your payment information & your medication will be mailed to you. If you need to contact the pharmacy, their contact information is as follows: [address] [address]  [phone]. THIS WAS A COSMETIC TREATMENT AND PATIENT PAID OUT OF POCKET, DO NOT BILL INSURANCE. AMOUNT PAID: $1800.00 for buy 4, get 1 treatment. Total of 5 treatments. Today is treatment number 2 of 5 treatments.

## 2023-08-30 NOTE — PROGRESS NOTES
Subjective:     Kaz Hickman is a 19 y.o. [address] [facility] female who presents to [facility]. She was previously seen by Dickson Mckeon in 3/2021. She has also been following with pediatric endocrinologist, Dr. Puma Bolton. Menarche was at age 6. Gonzales Rich has been diagnosed with PCOS. She is currently on Ortho-Cyclen and has been tolerating it well. She states that her periods come monthly and last 5-6 days. She reports that she wears medium sized pads and on her heaviest days changes them 4-5 times out of convenience (when using the bathroom) rather than due to saturation. She denies flooding. She reports that the pain is "not too bad". Every few months she has a little pain. She recently started laser hair removal for hirsutism and has not yet seen results. Patient Active Problem List   Diagnosis   • Thyromegaly   • PCOS (polycystic ovarian syndrome)   • Hyperlipidemia     Past Medical History:   Diagnosis Date   • 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, codeine   • No known health problems        Objective:    Vitals: Blood pressure 104/63, height 5' 1" (1.549 m), weight 56.7 kg (125 lb), last menstrual period 08/26/2023, not currently breastfeeding. Body mass index is 23.62 kg/m². Physical Exam  Vitals reviewed. Constitutional:       General: She is not in acute distress. Appearance: Normal appearance. She is well-developed. She is not ill-appearing, toxic-appearing or diaphoretic. HENT:      Head:      Comments: Minimal shadowing noted consistent with known hirsutism  Cardiovascular:      Rate and Rhythm: Normal rate. Pulmonary:      Effort: Pulmonary effort is normal. No respiratory distress. Skin:     General: Skin is warm and dry. Neurological:      Mental Status: She is alert and oriented to person, place, and time.    Psychiatric:         Mood and Affect: Mood normal.         Behavior: Behavior normal.         Assessment/Plan:    Problem List Items Addressed This Visit        Unprioritized    PCOS (polycystic ovarian syndrome)       We reviewed the pathophysiology of PCOS. We reviewed the role of OCPs in hormone control for her. We discussed alternatives to OCPs per patient's mother's request - we reviewed "PCOS diet" and Metformin". We reviewed that further hormone control techniques can be attempted/ reviewed if/when patient desires to discontinue OCPs and/or start a family. Patient is satisfied with her OCPs at this time. We reviewed that hair removal treatments do take time to work but that if she is unsatisfied with her results after the full treatment, she can continue to discuss with dermatology and/or we can discuss androgen blocker (kandi). Refill of OCPs given. We reviewed need for annual GYN exams (parts of exam can be discussed based on patient's complaints, age, and sexual activity). She was therefore encouraged to RTO in 1 year for annual exam and pap at age 22.  All questions answered to patient and mother's satisfaction         Relevant Medications    norgestimate-ethinyl estradiol (ORTHO-CYCLEN) 0.25-35 MG-MCG per tablet     Moses Hidalgo MD  9/2/2023  10:44 PM

## 2023-08-31 ENCOUNTER — OFFICE VISIT (OUTPATIENT)
Dept: OBGYN CLINIC | Facility: CLINIC | Age: 18
End: 2023-08-31
Payer: COMMERCIAL

## 2023-08-31 VITALS
BODY MASS INDEX: 23.6 KG/M2 | SYSTOLIC BLOOD PRESSURE: 104 MMHG | HEIGHT: 61 IN | WEIGHT: 125 LBS | DIASTOLIC BLOOD PRESSURE: 63 MMHG

## 2023-08-31 DIAGNOSIS — E28.2 PCOS (POLYCYSTIC OVARIAN SYNDROME): ICD-10-CM

## 2023-08-31 PROCEDURE — 99214 OFFICE O/P EST MOD 30 MIN: CPT | Performed by: OBSTETRICS & GYNECOLOGY

## 2023-08-31 RX ORDER — NORGESTIMATE AND ETHINYL ESTRADIOL 0.25-0.035
1 KIT ORAL DAILY
Qty: 168 TABLET | Refills: 1 | Status: SHIPPED | OUTPATIENT
Start: 2023-08-31

## 2023-09-01 ENCOUNTER — TELEPHONE (OUTPATIENT)
Age: 18
End: 2023-09-01

## 2023-09-03 NOTE — ASSESSMENT & PLAN NOTE
We reviewed the pathophysiology of PCOS. We reviewed the role of OCPs in hormone control for her. We discussed alternatives to OCPs per patient's mother's request - we reviewed "PCOS diet" and Metformin". We reviewed that further hormone control techniques can be attempted/ reviewed if/when patient desires to discontinue OCPs and/or start a family. Patient is satisfied with her OCPs at this time. We reviewed that hair removal treatments do take time to work but that if she is unsatisfied with her results after the full treatment, she can continue to discuss with dermatology and/or we can discuss androgen blocker (kandi). Refill of OCPs given. We reviewed need for annual GYN exams (parts of exam can be discussed based on patient's complaints, age, and sexual activity). She was therefore encouraged to RTO in 1 year for annual exam and pap at age 22.  All questions answered to patient and mother's satisfaction

## 2023-09-11 ENCOUNTER — COSMETIC (OUTPATIENT)
Dept: DERMATOLOGY | Facility: CLINIC | Age: 18
End: 2023-09-11

## 2023-09-11 DIAGNOSIS — Z41.1 ENCOUNTER FOR COSMETIC LASER PROCEDURE: ICD-10-CM

## 2023-09-11 DIAGNOSIS — L73.1 PSEUDOFOLLICULITIS BARBAE: Primary | ICD-10-CM

## 2023-09-11 PROCEDURE — BUNDLE PR BUNDLE: Performed by: STUDENT IN AN ORGANIZED HEALTH CARE EDUCATION/TRAINING PROGRAM

## 2023-09-11 NOTE — PROGRESS NOTES
[facility]     Patient Name: Antoinette Hoover  Encounter Date: 9/11/23     Have you been cared for by a [facility]ologist in the last 3 years and, if so, which description applies to you? Yes. I have been here within the last 3 years, and my medical history has NOT changed since that time. I am FEMALE/of child-bearing potential.    REVIEW OF SYSTEMS:  Have you recently had or currently have any of the following? · No changes in my recent health. PAST MEDICAL HISTORY:  Have you personally ever had or currently have any of the following? If "YES," then please provide more detail. · No changes in my medical history. FAMILY HISTORY:  Any "first degree relatives" (parent, brother, sister, or child) with the following? • No changes in my family's known health. PATIENT EXPERIENCE:    • Do you want the Dermatologist to perform a COMPLETE skin exam today including a clinical examination under the "bra and underwear" areas? NO  • If necessary, do we have your permission to call and leave a detailed message on your Preferred Phone number that includes your specific medical information? Yes      Allergies   Allergen Reactions   • Codeine Hives and Shortness Of Breath   • Nuts - Food Allergy Anaphylaxis and Hives   • Pistachio Nut (Diagnostic) - Food Allergy Anaphylaxis and Hives      Current Outpatient Medications:   •  EPINEPHrine (EPIPEN) 0.3 mg/0.3 mL SOAJ, Inject 0.3 mL (0.3 mg total) into a muscle once for 1 dose As needed for anaphylactic reaction to nuts, Disp: 0.3 mL, Rfl: 0  •  hydroquinone 4 % cream, Apply to face once daily for 3 months, then take 1 month break. (Patient not taking: Reported on 8/31/2023), Disp: 30 g, Rfl: 3  •  ketoconazole (NIZORAL) 2 % shampoo, Daily for 2 weeks straight and then on "Mondays, Wednesdays and Fridays":  Lather into scalp and skin on face, neck, chest, and back; leave on for 5 minutes and then rinse off completely.  (Patient not taking: Reported on 8/31/2023), Disp: 120 mL, Rfl: 1  •  norgestimate-ethinyl estradiol (ORTHO-CYCLEN) 0.25-35 MG-MCG per tablet, Take 1 tablet by mouth daily, Disp: 168 tablet, Rfl: 1          • Whom besides the patient is providing clinical information about today's encounter?   o NO ADDITIONAL HISTORIAN (patient alone provided history)    Physical Exam and Assessment/Plan by Diagnosis:    Nd:YAG Laser Treatment    9/11/2023  Device: Kelli Araujo Max Pro  Place of Treatment: [facility]: Caleb Patel MD  Assistant: Ayah Verma [facility]    Treatment number: 3  Site of treatment: Upper lip, Chin, and Jaw Line  Skin type: Clayborn Lupe III    Pre-operative Diagnosis: Pseudofolliculitis barbae  Indications for Procedure:  same as above  Post-operative Diagnosis: same as pre-operative    Anesthetic: cool geoffrey     Safety Precautions:  Fire extinguisher persent/Window covered/Staff and patient eyes covered/Warning sign posted     Interim History/Comments:  Continuing to use lightening cream on hyperpigmented are under left jawline  Percent Improvement: N/A    Parameters: Laser was set to 1064nm    Fluence: 18 J/cm2  Pulse duration: 10 msec  Spot size: 18 mm  Pulse count: 96     Procedure Note:   After discussing potential procedure related risks including but not limited to pain, purpura, blistering, scarring, discoloration, “footprinting,” recurrence, inefficacy, need for additional treatment, and undesirable cosmetic results, written and verbal consent were obtained. Patient was brought back to the operating room. Time out was performed. Patient's name, identification and intended procedure were verified. Prior to the procedure, the patient cleansed the treatment area. The treatment area was re-cleansed with alcohol. Eye coverings eye shield inserted/placed.      The cooling device was set to 40 msec msec pre / 20 msec delay     Tolerance: Patient tolerated the procedure well with no immediate significant complications and left in stable condition. Complications: none  Other procedures: removed ingrown hair growing parallel to skin surface on right preauricular cheek  Photography: no    Post-op Care: Aftercare was discussed. Continue to ice at home and keep the area moist with a gentle moisturizer. Advised the patient to avoid exercise for the next 24 hours and also to be cautious with sun exposure over the next week. Advised patient to call the office if there is excessive swelling. Follow-up:  Patient is aware that it will take multiple treatments to treat this condition. Return to clinic for re-treatment in 4 - 6 weeks. THIS WAS A COSMETIC TREATMENT AND PATIENT PAID OUT OF POCKET, DO NOT BILL INSURANCE. AMOUNT PAID: $1800.00 for a total of 5 treatments. ( package of 4 treatments - buy 4 get 1 treatment ) Today is treatment number 3 of 5.       Scribe Attestation    I,:  Wale Ardon MA am acting as a scribe while in the presence of the attending physician.:       I,:  Staci Euceda MD personally performed the services described in this documentation    as scribed in my presence.:

## 2023-09-21 ENCOUNTER — TELEPHONE (OUTPATIENT)
Dept: DERMATOLOGY | Facility: CLINIC | Age: 18
End: 2023-09-21

## 2023-09-26 NOTE — TELEPHONE ENCOUNTER
Called and spoke with patient and scheduled for last 2 treatments for [facility] for package.
Left message for patient to call my direct extension at [phone] to schedule her cosmetic procedure in the [facility] office. Please have patient call me directly.      Thank you
Spoke with the patient.  RN writer advised that I had faxed updated instructions to the pharmacy to be given to her.  She states that she does have the info.  She asks if she can have a [place] instant breakfast and is advised that this isn't a clear liquid and so needs to be avoided.  Reviewed other clear liquid options.  She denies any other questions or concerns.  
Normal genitalia; no lesions; no discharge

## 2023-10-19 ENCOUNTER — PROCEDURE VISIT (OUTPATIENT)
Dept: DERMATOLOGY | Facility: CLINIC | Age: 18
End: 2023-10-19

## 2023-10-19 DIAGNOSIS — Z41.1 ENCOUNTER FOR COSMETIC LASER PROCEDURE: Primary | ICD-10-CM

## 2023-10-19 DIAGNOSIS — L73.1 PSEUDOFOLLICULITIS BARBAE: ICD-10-CM

## 2023-10-19 NOTE — PROGRESS NOTES
[facility]     Patient Name: Huan Queen  Encounter Date: 10/19/23     Have you been cared for by a [facility]ologist in the last 3 years and, if so, which description applies to you? Yes. I have been here within the last 3 years, and my medical history has NOT changed since that time. I am FEMALE/of child-bearing potential.    REVIEW OF SYSTEMS:  Have you recently had or currently have any of the following? No changes in my recent health. PAST MEDICAL HISTORY:  Have you personally ever had or currently have any of the following? If "YES," then please provide more detail. No changes in my medical history. HISTORY OF IMMUNOSUPPRESSION: Do you have a history of any of the following:  Systemic Immunosuppression such as Diabetes, Biologic or Immunotherapy, Chemotherapy, Organ Transplantation, Bone Marrow Transplantation? No     Answering "YES" requires the addition of the dotphrase "IMMUNOSUPPRESSED" as the first diagnosis of the patient's visit. FAMILY HISTORY:  Any "first degree relatives" (parent, brother, sister, or child) with the following? No changes in my family's known health. PATIENT EXPERIENCE:    Do you want the Dermatologist to perform a COMPLETE skin exam today including a clinical examination under the "bra and underwear" areas? NO  If necessary, do we have your permission to call and leave a detailed message on your Preferred Phone number that includes your specific medical information?   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    Pistachio Nut (Diagnostic) - Food Allergy Anaphylaxis and Hives      Current Outpatient Medications:     EPINEPHrine (EPIPEN) 0.3 mg/0.3 mL SOAJ, Inject 0.3 mL (0.3 mg total) into a muscle once for 1 dose As needed for anaphylactic reaction to nuts, Disp: 0.3 mL, Rfl: 0    hydroquinone 4 % cream, Apply to face once daily for 3 months, then take 1 month break. (Patient not taking: Reported on 8/31/2023), Disp: 30 g, Rfl: 3    ketoconazole (NIZORAL) 2 % shampoo, Daily for 2 weeks straight and then on "Mondays, Wednesdays and Fridays":  Lather into scalp and skin on face, neck, chest, and back; leave on for 5 minutes and then rinse off completely. (Patient not taking: Reported on 8/31/2023), Disp: 120 mL, Rfl: 1    norgestimate-ethinyl estradiol (ORTHO-CYCLEN) 0.25-35 MG-MCG per tablet, Take 1 tablet by mouth daily, Disp: 168 tablet, Rfl: 1          Whom besides the patient is providing clinical information about today's encounter? NO ADDITIONAL HISTORIAN (patient alone provided history)    Physical Exam and Assessment/Plan by Diagnosis:    Alexandrite Laser Treatment  10/19/2023    Device: Rosalba Gentle Max Pro  Place of Treatment: [facility]: Caleb Patel MD  Assistant: Ayah Verma, [facility]    Treatment number: 4  Site of treatment: Upper lip, Chin, and Jaw Line   Skin type: Clayborn Lupe III    Pre-operative Diagnosis:   Indications for Surgery:    Post-operative Diagnosis: same as pre-operative    Anesthetic: Sid cooler     Safety Precautions:  Fire extinguisher persent/Window covered/Staff and patient eyes covered/Warning sign posted     Interim History/Comments:  Patient is now using Retinol every other night, has held off lightening cream to test spot that hyperpigmented  Percent Improvement: N/A    Parameters: Laser was set to 755nm    Fluence: 18 J/cm2  Pulse duration: 10 msec  Spot size: 18 mm  Pulse count: 121     Procedure Note:   After discussing potential procedure related risks including but not limited to pain, purpura, blistering, scarring, discoloration, “footprinting,” recurrence, inefficacy, need for additional treatment, and undesirable cosmetic results, written and verbal consent were obtained. Patient was brought back to the operating room. Time out was performed.   Patient's name, identification and intended procedure were verified. Prior to the procedure, the patient cleansed the treatment area. The treatment area was re-cleansed with alcohol. Eye coverings eye shield inserted/placed. The cooling device was set to 40 msec msec pre / 20 msec delay     Tolerance: Patient tolerated the procedure well with no immediate significant complications and left in stable condition. Complications: none  Other procedures: none  Photography: no    Post-op Care: Aftercare was discussed. Continue to ice at home and keep the area moist with a gentle moisturizer. Advised the patient to avoid exercise for the next 24 hours and also to be cautious with sun exposure over the next week. Advised patient to call the office if there is excessive swelling. Follow-up:  Patient is aware that it will take multiple treatments to treat this condition. Return to clinic for re-treatment in 4 - 6 weeks. THIS WAS A COSMETIC TREATMENT AND PATIENT PAID OUT OF POCKET, DO NOT BILL INSURANCE. AMOUNT PAID: $1800.00  for a total of 5 treatments. ( package of 4 treatments - buy 4 get 1 treatment ) Today is treatment number 4 of 5.          Scribe Attestation      I,:  Rajan Poon MA am acting as a scribe while in the presence of the attending physician.:       I,:  Juan Jose Sun MD personally performed the services described in this documentation    as scribed in my presence.:

## 2023-11-21 ENCOUNTER — TELEPHONE (OUTPATIENT)
Dept: FAMILY MEDICINE CLINIC | Facility: CLINIC | Age: 18
End: 2023-11-21

## 2023-11-21 ENCOUNTER — APPOINTMENT (OUTPATIENT)
Dept: LAB | Facility: HOSPITAL | Age: 18
End: 2023-11-21

## 2023-11-21 DIAGNOSIS — Z11.1 TUBERCULOSIS SCREENING: ICD-10-CM

## 2023-11-21 DIAGNOSIS — Z01.84 IMMUNITY STATUS TESTING: Primary | ICD-10-CM

## 2023-11-21 PROCEDURE — 86480 TB TEST CELL IMMUN MEASURE: CPT

## 2023-11-21 PROCEDURE — 36415 COLL VENOUS BLD VENIPUNCTURE: CPT

## 2023-11-21 NOTE — TELEPHONE ENCOUNTER
Patient came in and picked up form. She is declining getting the Titers drawn, she signed where it states declined. Form is being scanned into media.      Krishan Rodriguez LPN

## 2023-11-21 NOTE — TELEPHONE ENCOUNTER
Filled out form, requires Titer blood draw for Hepatitis B. Informed Dr Isis Rhodes, she is going to place the order. I called the patient and informed her. She is going to stop in the office to  a printed out copy of the blood work order. Placing the immunization form in Nurse pending 1 for now.      Val Shane LPN

## 2023-11-22 LAB
GAMMA INTERFERON BACKGROUND BLD IA-ACNC: 0.01 IU/ML
M TB IFN-G BLD-IMP: NEGATIVE
M TB IFN-G CD4+ BCKGRND COR BLD-ACNC: 0.05 IU/ML
M TB IFN-G CD4+ BCKGRND COR BLD-ACNC: 0.06 IU/ML
MITOGEN IGNF BCKGRD COR BLD-ACNC: 9.34 IU/ML

## 2023-11-27 ENCOUNTER — OFFICE VISIT (OUTPATIENT)
Dept: FAMILY MEDICINE CLINIC | Facility: CLINIC | Age: 18
End: 2023-11-27
Payer: COMMERCIAL

## 2023-11-27 VITALS
TEMPERATURE: 97.9 F | HEIGHT: 61 IN | OXYGEN SATURATION: 99 % | BODY MASS INDEX: 22.84 KG/M2 | WEIGHT: 121 LBS | RESPIRATION RATE: 16 BRPM | HEART RATE: 100 BPM | SYSTOLIC BLOOD PRESSURE: 102 MMHG | DIASTOLIC BLOOD PRESSURE: 60 MMHG

## 2023-11-27 DIAGNOSIS — R59.1 LYMPHADENOPATHY: ICD-10-CM

## 2023-11-27 DIAGNOSIS — E78.5 HYPERLIPIDEMIA, UNSPECIFIED HYPERLIPIDEMIA TYPE: Primary | ICD-10-CM

## 2023-11-27 DIAGNOSIS — M79.18 MYOFASCIAL MUSCLE PAIN: ICD-10-CM

## 2023-11-27 PROCEDURE — 99214 OFFICE O/P EST MOD 30 MIN: CPT | Performed by: FAMILY MEDICINE

## 2023-11-27 NOTE — PROGRESS NOTES
Name: Roseanna Earl      : 2005      MRN: [id number]  Encounter Provider: Aaron Fernandez MD  Encounter Date: 2023   Encounter department: [address] [place] [address]     1. Hyperlipidemia, unspecified hyperlipidemia type  Comments:  She has been working out and losing weight. Orders:  -     Lipid Panel with Direct LDL reflex; Future  -     Lipid Panel with Direct LDL reflex    2. Myofascial muscle pain  Comments:  Pain throughout upper extremities and chest after lifting. Counseled on stretches, as needed ibuprofen/tylenol, rest. Pain has been improving. 3. Lymphadenopathy  Comments:  Has had lymph node in the right back of her neck for the past couple of months since tick bite in the area. It is getting smaller. Advised to monitor for now. Subjective      HPI  Rema Vincent is here today to discuss multiple things. Last week she had pain on the right side of her chest and right arm after lifting weights. The pain also radiated to her left upper extremity. No SOB, palpitations, weakness, dizziness. The pain improved on its own. She has a lymph node on the right back of her neck which she has had for the past couple of months since tick bite in the area. Has been decreasing in size. No pain, fevers, chills, night sweats, unintended weight loss. She has been exercising and losing weight. Wants to check her cholesterol levels. Review of Systems   Constitutional: Negative. HENT: Negative. Eyes: Negative. Respiratory: Negative. Cardiovascular: Negative. Gastrointestinal: Negative. Endocrine: Negative. Genitourinary: Negative. Musculoskeletal: Negative. Skin: Negative. Allergic/Immunologic: Negative. Neurological: Negative. Hematological: Negative. Psychiatric/Behavioral: Negative.          Current Outpatient Medications on File Prior to Visit   Medication Sig    EPINEPHrine (EPIPEN) 0.3 mg/0.3 mL SOAJ Inject 0.3 mL (0.3 mg total) into a muscle once for 1 dose As needed for anaphylactic reaction to nuts    norgestimate-ethinyl estradiol (ORTHO-CYCLEN) 0.25-35 MG-MCG per tablet Take 1 tablet by mouth daily    [DISCONTINUED] hydroquinone 4 % cream Apply to face once daily for 3 months, then take 1 month break. (Patient not taking: Reported on 8/31/2023)    [DISCONTINUED] ketoconazole (NIZORAL) 2 % shampoo Daily for 2 weeks straight and then on "Mondays, Wednesdays and Fridays":  Lather into scalp and skin on face, neck, chest, and back; leave on for 5 minutes and then rinse off completely. (Patient not taking: Reported on 8/31/2023)       Objective     /60   Pulse 100   Temp 97.9 °F (36.6 °C)   Resp 16   Ht 5' 1" (1.549 m)   Wt 54.9 kg (121 lb)   SpO2 99%   BMI 22.86 kg/m²     Physical Exam  Constitutional:       General: She is not in acute distress. Appearance: She is well-developed. She is not diaphoretic. HENT:      Head: Normocephalic and atraumatic. Cardiovascular:      Rate and Rhythm: Normal rate and regular rhythm. Heart sounds: Normal heart sounds. No murmur heard. No friction rub. No gallop. Pulmonary:      Effort: Pulmonary effort is normal. No respiratory distress. Breath sounds: Normal breath sounds. No wheezing or rales. Chest:      Chest wall: No tenderness. Musculoskeletal:         General: No deformity. Normal range of motion. Cervical back: Normal range of motion and neck supple. Lymphadenopathy:      Cervical: Cervical adenopathy present. Left cervical: Posterior cervical adenopathy (one lymph node noted on the right posterior cervical region. no tenderness.) present. Skin:     General: Skin is warm and dry. Neurological:      Mental Status: She is alert and oriented to person, place, and time. Psychiatric:         Behavior: Behavior normal.         Thought Content:  Thought content normal.         Judgment: Judgment normal.       Sanchez Estrada MD

## 2023-11-30 ENCOUNTER — PROCEDURE VISIT (OUTPATIENT)
Dept: DERMATOLOGY | Facility: CLINIC | Age: 18
End: 2023-11-30

## 2023-11-30 DIAGNOSIS — Z41.1 ENCOUNTER FOR COSMETIC LASER PROCEDURE: Primary | ICD-10-CM

## 2023-11-30 DIAGNOSIS — L81.0 POST-INFLAMMATORY HYPERPIGMENTATION: ICD-10-CM

## 2023-11-30 RX ORDER — HYDROQUINONE 40 MG/G
CREAM TOPICAL
Qty: 30 G | Refills: 3 | Status: SHIPPED | OUTPATIENT
Start: 2023-11-30

## 2023-11-30 NOTE — PROGRESS NOTES
[facility] Note     Patient Name: Huan Queen  Encounter Date: 10/19/23      Have you been cared for by a [facility]atologist in the last 3 years and, if so, which description applies to you? Yes. I have been here within the last 3 years, and my medical history has NOT changed since that time. I am FEMALE/of child-bearing potential.    REVIEW OF SYSTEMS:  Have you recently had or currently have any of the following? No changes in my recent health. PAST MEDICAL HISTORY:  Have you personally ever had or currently have any of the following? If "YES," then please provide more detail. No changes in my medical history. HISTORY OF IMMUNOSUPPRESSION: Do you have a history of any of the following:  Systemic Immunosuppression such as Diabetes, Biologic or Immunotherapy, Chemotherapy, Organ Transplantation, Bone Marrow Transplantation? No      Answering "YES" requires the addition of the dotphrase "IMMUNOSUPPRESSED" as the first diagnosis of the patient's visit. FAMILY HISTORY:  Any "first degree relatives" (parent, brother, sister, or child) with the following? No changes in my family's known health. PATIENT EXPERIENCE:    Do you want the Dermatologist to perform a COMPLETE skin exam today including a clinical examination under the "bra and underwear" areas? NO  If necessary, do we have your permission to call and leave a detailed message on your Preferred Phone number that includes your specific medical information?   Yes           Allergies   Allergen Reactions    Codeine Hives and Shortness Of Breath    Nuts - Food Allergy Anaphylaxis and Hives    Pistachio Nut (Diagnostic) - Food Allergy Anaphylaxis and Hives       Current Outpatient Medications:     EPINEPHrine (EPIPEN) 0.3 mg/0.3 mL SOAJ, Inject 0.3 mL (0.3 mg total) into a muscle once for 1 dose As needed for anaphylactic reaction to nuts, Disp: 0.3 mL, Rfl: 0    hydroquinone 4 % cream, Apply to face once daily for 3 months, then take 1 month break. (Patient not taking: Reported on 8/31/2023), Disp: 30 g, Rfl: 3    ketoconazole (NIZORAL) 2 % shampoo, Daily for 2 weeks straight and then on "Mondays, Wednesdays and Fridays":  Lather into scalp and skin on face, neck, chest, and back; leave on for 5 minutes and then rinse off completely. (Patient not taking: Reported on 8/31/2023), Disp: 120 mL, Rfl: 1    norgestimate-ethinyl estradiol (ORTHO-CYCLEN) 0.25-35 MG-MCG per tablet, Take 1 tablet by mouth daily, Disp: 168 tablet, Rfl: 1            Whom besides the patient is providing clinical information about today's encounter? NO ADDITIONAL HISTORIAN (patient alone provided history)     Physical Exam and Assessment/Plan by Diagnosis:     Nd:YAG Laser Treatment  11/30/23    Device: Anita [place] Gentle Max Pro  Place of Treatment: [facility] AT [facility]: Kelsie Rodriguez MD  Assistant: Jesus Guevara, [facility]     Treatment number: 4  Site of treatment: Upper lip, Chin, and Jaw Line   Skin type: Bartolo Mayte III     Pre-operative Diagnosis:   Indications for Surgery:    Post-operative Diagnosis: same as pre-operative     Anesthetic: Sid cooler     Safety Precautions:  Fire extinguisher persent/Window covered/Staff and patient eyes covered/Warning sign posted     Interim History/Comments:  Patient is now using Retinol every other night, has held off lightening cream to test spot that hyperpigmented  Percent Improvement: N/A     Parameters: Laser was set to 1064 nm     Fluence: 18 J/cm2  Pulse duration: 10 msec  Spot size: 18 mm     Procedure Note:   After discussing potential procedure related risks including but not limited to pain, purpura, blistering, scarring, discoloration, “footprinting,” recurrence, inefficacy, need for additional treatment, and undesirable cosmetic results, written and verbal consent were obtained. Patient was brought back to the operating room. Time out was performed.   Patient's name, identification and intended procedure were verified. Prior to the procedure, the patient cleansed the treatment area. The treatment area was re-cleansed with alcohol. Eye coverings eye shield inserted/placed. The cooling device was set to 40 msec msec pre / 20 msec delay     Tolerance: Patient tolerated the procedure well with no immediate significant complications and left in stable condition. Complications: none  Other procedures: none  Photography: no     Post-op Care: Aftercare was discussed. Continue to ice at home and keep the area moist with a gentle moisturizer. Advised the patient to avoid exercise for the next 24 hours and also to be cautious with sun exposure over the next week. Advised patient to call the office if there is excessive swelling. Follow-up:  Patient is aware that it will take multiple treatments to treat this condition. Return to clinic for re-treatment in 4 - 6 weeks. THIS WAS A COSMETIC TREATMENT AND PATIENT PAID OUT OF POCKET, DO NOT BILL INSURANCE. AMOUNT PAID: $1800.00  for a total of 5 treatments. ( package of 4 treatments - buy 4 get 1 treatment ) Today is treatment number 5 of 5.         RTC in 6-8  weeks for follow up and possible re-treatment  Scribe Attestation       I,:  Meryl Cheadle, MA am acting as a scribe while in the presence of the attending physician.:       I,:  Nitin Meyers MD personally performed the services described in this documentation    as scribed in my presence.:

## 2023-11-30 NOTE — PROGRESS NOTES
[facility] Note     Patient Name: Neris Al  Encounter Date: 11/30/2023     Have you been cared for by a [facility]atologist in the last 3 years and, if so, which description applies to you? Yes. I have been here within the last 3 years, and my medical history has NOT changed since that time. I am FEMALE/of child-bearing potential.    REVIEW OF SYSTEMS:  Have you recently had or currently have any of the following? No changes in my recent health. PAST MEDICAL HISTORY:  Have you personally ever had or currently have any of the following? If "YES," then please provide more detail. No changes in my medical history. HISTORY OF IMMUNOSUPPRESSION: Do you have a history of any of the following:  Systemic Immunosuppression such as Diabetes, Biologic or Immunotherapy, Chemotherapy, Organ Transplantation, Bone Marrow Transplantation? No     Answering "YES" requires the addition of the dotphrase "IMMUNOSUPPRESSED" as the first diagnosis of the patient's visit. FAMILY HISTORY:  Any "first degree relatives" (parent, brother, sister, or child) with the following? No changes in my family's known health. PATIENT EXPERIENCE:    Do you want the Dermatologist to perform a COMPLETE skin exam today including a clinical examination under the "bra and underwear" areas? NO  If necessary, do we have your permission to call and leave a detailed message on your Preferred Phone number that includes your specific medical information?   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    Pistachio Nut (Diagnostic) - Food Allergy Anaphylaxis and Hives      Current Outpatient Medications:     EPINEPHrine (EPIPEN) 0.3 mg/0.3 mL SOAJ, Inject 0.3 mL (0.3 mg total) into a muscle once for 1 dose As needed for anaphylactic reaction to nuts, Disp: 0.3 mL, Rfl: 0    norgestimate-ethinyl estradiol (ORTHO-CYCLEN) 0.25-35 MG-MCG per tablet, Take 1 tablet by mouth daily, Disp: 168 tablet, Rfl: 1          Whom besides the patient is providing clinical information about today's encounter? NO ADDITIONAL HISTORIAN (patient alone provided history)    Physical Exam and Assessment/Plan by Diagnosis:    Nd:YAG Laser Treatment  11/30/23    Device: Jeannie Wesley  Place of Treatment: [facility] AT [facility]: Micaela Urias MD  Assistant: Taylor Thurston, [facility]     Treatment number: 4  Site of treatment: Upper lip, Chin, and Jaw Line   Skin type: Carlin Mano III     Pre-operative Diagnosis:   Indications for Surgery:    Post-operative Diagnosis: same as pre-operative     Anesthetic: Sid cooler     Safety Precautions:  Fire extinguisher persent/Window covered/Staff and patient eyes covered/Warning sign posted     Interim History/Comments:  Patient is now using Retinol every other night, has held off lightening cream to test spot that hyperpigmented  Percent Improvement: N/A     Parameters: Laser was set to 1064 nm     Fluence: 18 J/cm2  Pulse duration: 10 msec  Spot size: 18 mm  Pulse Count: 107    Procedure Note:   After discussing potential procedure related risks including but not limited to pain, purpura, blistering, scarring, discoloration, “footprinting,” recurrence, inefficacy, need for additional treatment, and undesirable cosmetic results, written and verbal consent were obtained. Patient was brought back to the operating room. Time out was performed. Patient's name, identification and intended procedure were verified. Prior to the procedure, the patient cleansed the treatment area. The treatment area was re-cleansed with alcohol. Eye coverings eye shield inserted/placed. The cooling device was set to 40 msec msec pre / 20 msec delay     Tolerance: Patient tolerated the procedure well with no immediate significant complications and left in stable condition.    Complications: none  Other procedures: none  Photography: no     Post-op Care: Aftercare was discussed. Continue to ice at home and keep the area moist with a gentle moisturizer. Advised the patient to avoid exercise for the next 24 hours and also to be cautious with sun exposure over the next week. Advised patient to call the office if there is excessive swelling. Follow-up:  Patient is aware that it will take multiple treatments to treat this condition. Return to clinic for re-treatment in 4 - 6 weeks. THIS WAS A COSMETIC TREATMENT AND PATIENT PAID OUT OF POCKET, DO NOT BILL INSURANCE. AMOUNT PAID: $1800.00  for a total of 5 treatments. ( package of 4 treatments - buy 4 get 1 treatment ) Today is treatment number 5 of 5.         RTC in 6-8  weeks for follow up and possible re-treatment        Scribe Attestation      I,:  Bhavik Maier am acting as a scribe while in the presence of the attending physician.:       I,:  Wilfredo Card MD personally performed the services described in this documentation    as scribed in my presence.:

## 2023-12-06 LAB
CHOLEST SERPL-MCNC: 239 MG/DL (ref 100–199)
HDLC SERPL-MCNC: 67 MG/DL
LDLC SERPL CALC-MCNC: 134 MG/DL (ref 0–99)
LDLC/HDLC SERPL: 2 RATIO (ref 0–3.2)
MICRODELETION SYND BLD/T FISH: NORMAL
SL AMB VLDL CHOLESTEROL CALC: 38 MG/DL (ref 5–40)
TRIGL SERPL-MCNC: 217 MG/DL (ref 0–149)

## 2023-12-14 ENCOUNTER — OFFICE VISIT (OUTPATIENT)
Dept: FAMILY MEDICINE CLINIC | Facility: CLINIC | Age: 18
End: 2023-12-14
Payer: COMMERCIAL

## 2023-12-14 VITALS
BODY MASS INDEX: 22.82 KG/M2 | RESPIRATION RATE: 16 BRPM | HEIGHT: 62 IN | SYSTOLIC BLOOD PRESSURE: 128 MMHG | HEART RATE: 90 BPM | WEIGHT: 124 LBS | OXYGEN SATURATION: 98 % | DIASTOLIC BLOOD PRESSURE: 68 MMHG | TEMPERATURE: 98.2 F

## 2023-12-14 DIAGNOSIS — E78.5 HYPERLIPIDEMIA, UNSPECIFIED HYPERLIPIDEMIA TYPE: ICD-10-CM

## 2023-12-14 DIAGNOSIS — Z00.00 WELL ADULT EXAM: Primary | ICD-10-CM

## 2023-12-14 DIAGNOSIS — E01.0 THYROMEGALY: ICD-10-CM

## 2023-12-14 PROCEDURE — 99395 PREV VISIT EST AGE 18-39: CPT | Performed by: FAMILY MEDICINE

## 2023-12-14 NOTE — ASSESSMENT & PLAN NOTE
Lab Results   Component Value Date    CHOLESTEROL 239 (H) 12/05/2023    CHOLESTEROL 238 (H) 07/20/2023    CHOLESTEROL 229 (H) 11/09/2022     Lab Results   Component Value Date    HDL 67 12/05/2023    HDL 72 07/20/2023    HDL 57 11/09/2022     Lab Results   Component Value Date    TRIG 217 (H) 12/05/2023    TRIG 152 (H) 07/20/2023    TRIG 244 (H) 11/09/2022     No results found for: "[address]"   Recently worsened. Counseled on diet/exercise. Recommend starting an OTC fish oil supplement.

## 2023-12-14 NOTE — PROGRESS NOTES
[address]    NAME: Khloe Slaughter  AGE: 19 y.o. SEX: female  : 2005     DATE: 2023     Assessment and Plan:     Problem List Items Addressed This Visit          Endocrine    Thyromegaly    Relevant Orders    TSH, 3rd generation    T4, free       Other    Hyperlipidemia     Lab Results   Component Value Date    CHOLESTEROL 239 (H) 2023    CHOLESTEROL 238 (H) 2023    CHOLESTEROL 229 (H) 2022     Lab Results   Component Value Date    HDL 67 2023    HDL 72 2023    HDL 57 2022     Lab Results   Component Value Date    TRIG 217 (H) 2023    TRIG 152 (H) 2023    TRIG 244 (H) 2022     No results found for: "[address]"   Recently worsened. Counseled on diet/exercise. Recommend starting an OTC fish oil supplement. Relevant Orders    Comprehensive metabolic panel    Lipid Panel with Direct LDL reflex     Other Visit Diagnoses       Well adult exam    -  Primary            Immunizations and preventive care screenings were discussed with patient today. Appropriate education was printed on patient's after visit summary. Depression Screening and Follow-up Plan: Patient was screened for depression during today's encounter. They screened negative with a PHQ-2 score of 0. No follow-ups on file. Chief Complaint:     Chief Complaint   Patient presents with    Physical Exam     Lisa/ANASTACIO      History of Present Illness:     Adult Annual Physical   Patient here for a comprehensive physical exam. The patient reports no problems. Diet and Physical Activity  Diet/Nutrition: well balanced diet. Exercise: 5-7 times a week on average.       Depression Screening  PHQ-2/9 Depression Screening    Little interest or pleasure in doing things: 0 - not at all  Feeling down, depressed, or hopeless: 0 - not at all  PHQ-2 Score: 0  PHQ-2 Interpretation: Negative depression screen General Health  Sleep: sleeps well. Hearing: normal - bilateral.  Vision: no vision problems. Dental: regular dental visits, brushes teeth twice daily, and flosses teeth occasionally. /GYN Health  Follows with gynecology? yes   Last menstrual period:11/21/23- regular periods. Review of Systems:     Review of Systems   Constitutional: Negative. HENT: Negative. Eyes: Negative. Respiratory: Negative. Cardiovascular: Negative. Gastrointestinal: Negative. Endocrine: Negative. Genitourinary: Negative. Musculoskeletal: Negative. Skin: Negative. Allergic/Immunologic: Negative. Neurological: Negative. Hematological: Negative. Psychiatric/Behavioral: Negative.         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, codeine    No known health problems       Past Surgical History:     Past Surgical History:   Procedure Laterality Date    NO PAST SURGERIES        Social History:     Social History     Socioeconomic History    Marital status: Single     Spouse name: None    Number of children: None    Years of education: currently in school, full-time student    Highest education level: None   Occupational History    None   Tobacco Use    Smoking status: Never    Smokeless tobacco: Never   Vaping Use    Vaping status: Never Used   Substance and Sexual Activity    Alcohol use: Never    Drug use: Never    Sexual activity: Never   Other Topics Concern    None   Social History Narrative    Brother    Caffeine use    Lives with parents    Older siblings    sibling     Social Determinants of Health     Financial Resource Strain: Not on file   Food Insecurity: Not on file   Transportation Needs: Not on file   Physical Activity: Not on file   Stress: Not on file   Social Connections: Not on file   Intimate Partner Violence: Not on file   Housing Stability: Not on file      Family History:     Family History   Problem Relation Age of Onset    No Known Problems Mother     Hypothyroidism Father         acquired    Hyperlipidemia Father     Diabetes Maternal Grandmother     Stroke Maternal Grandfather     Heart disease Paternal Grandfather     Diabetes Maternal Aunt     Diabetes Maternal Uncle     Thyroid cancer Family       Current Medications:     Current Outpatient Medications   Medication Sig Dispense Refill    EPINEPHrine (EPIPEN) 0.3 mg/0.3 mL SOAJ Inject 0.3 mL (0.3 mg total) into a muscle once for 1 dose As needed for anaphylactic reaction to nuts 0.3 mL 0    hydroquinone 4 % cream Apply to face once daily for 3 months, then take 1 month break. 30 g 3    norgestimate-ethinyl estradiol (ORTHO-CYCLEN) 0.25-35 MG-MCG per tablet Take 1 tablet by mouth daily 168 tablet 1     No current facility-administered medications for this visit. Allergies: Allergies   Allergen Reactions    Codeine Hives and Shortness Of Breath    Nuts - Food Allergy Anaphylaxis and Hives    Pistachio Nut (Diagnostic) - Food Allergy Anaphylaxis and Hives      Physical Exam:     /68   Pulse 90   Temp 98.2 °F (36.8 °C) (Temporal)   Resp 16   Ht 5' 1.5" (1.562 m)   Wt 56.2 kg (124 lb)   LMP 11/21/2023 (Exact Date)   SpO2 98%   BMI 23.05 kg/m²     Physical Exam  Vitals and nursing note reviewed. Constitutional:       General: She is not in acute distress. Appearance: She is well-developed. HENT:      Head: Normocephalic and atraumatic. Right Ear: Tympanic membrane, ear canal and external ear normal. There is no impacted cerumen. Left Ear: Tympanic membrane, ear canal and external ear normal. There is no impacted cerumen. Nose: Nose normal.      Mouth/Throat:      Mouth: Mucous membranes are moist.   Eyes:      Conjunctiva/sclera: Conjunctivae normal.   Cardiovascular:      Rate and Rhythm: Normal rate and regular rhythm. Heart sounds: No murmur heard.   Pulmonary:      Effort: Pulmonary effort is normal. No respiratory distress. Breath sounds: Normal breath sounds. Abdominal:      General: Abdomen is flat. There is no distension. Palpations: Abdomen is soft. There is no mass. Tenderness: There is no abdominal tenderness. There is no guarding or rebound. Hernia: No hernia is present. Musculoskeletal:         General: Normal range of motion. Cervical back: Neck supple. Skin:     General: Skin is warm and dry. Neurological:      General: No focal deficit present. Mental Status: She is alert and oriented to person, place, and time.           MD MUKUL Santiago[place] [facility]

## 2024-01-17 ENCOUNTER — PROCEDURE VISIT (OUTPATIENT)
Dept: DERMATOLOGY | Facility: CLINIC | Age: 19
End: 2024-01-17
Payer: COMMERCIAL

## 2024-01-17 DIAGNOSIS — L68.0 HIRSUTISM: Primary | ICD-10-CM

## 2024-01-17 PROCEDURE — 99213 OFFICE O/P EST LOW 20 MIN: CPT | Performed by: STUDENT IN AN ORGANIZED HEALTH CARE EDUCATION/TRAINING PROGRAM

## 2024-01-17 NOTE — PATIENT INSTRUCTIONS
"   Assessment and Plan:  Based on a thorough discussion of this condition and the management approach to it (including a comprehensive discussion of the known risks, side effects and potential benefits of treatment), the patient (family) agrees to implement the following specific plan:     Discussed to remember to use sun screen SPF 30+ daily.   Recommended to purchase flawless finishing touch facial hair removal      Discussed out of pocket:     Laser Hair removal/Chin $125   Laser Hair removal Upper Lip $125  Laser Hair removal Jawline $200     All three areas:Total $1800 for buy 4, get 1 treatment. Total of 5 treatments.  Two areas (upper lip, chin):Total $1000 for buy 4, get 1 treatment. Total of 5 treatments.     Pre-operative instructions, expectations for LHR discussed at length  DCA to call insurance and ask if prior authorization is needed for 96684 laser hair reduction for ICD 10 code for above noted condition.  If it is needed, please ask them for form.   If it is not needed, please have them transfer you to the \"benefits line\" for information on copay and deductible.       CPT code for LHR is 60550, one unit for each 30 minute increment.  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 Discussed touch up sessions likely to be required yearly.       LASER-HAIR REDUCTION INSTRUCTIONS     Prep-Care (what to do before your appointment)     Area must be shaved 24 hours prior to your appointment.  The closer the shave the better.   For bikini services, don’t shave the part where you want to keep the hair.  No makeup/lotion/deodorant on the day of your appointment (on treatment area).  Stay out of direct sunlight for at least 3 days prior to your appointment (& 3 days after).  Do not use self-kary or spray tan products for at least 2 weeks before your treatment to avoid potential injury.  Avoid drinking more than 2 alcoholic " beverages 24 hours before your treatment .  Avoid waxing/threading/tweezing in the area for at least 4 weeks. Shaving is ok!     Post-Care     Redness & Bumps are normal.  Immediately after your treatment, redness & bumps at the treatment area are common; these may last up to 2 hours or longer. It is normal for the treated area to feel like a sunburn for a few hours. You should use a cold compress if the sensitivity continues. If there is any crusting, apply an antibiotic cream. Darker pigmented skin may have more discomfort than lighter skin & may persist longer.  Cleanse the area treated gently.  The treated area may be washed gently with a mild soap. Skin should be patted dry & not rubbed during the first 48 hours.  No makeup & lotion/moisturizer/deodorant for the first 24 hours.  Keep the treated area clean & dry, if further redness or irritation persists, skip your makeup & moisturizer, & deodorant (for underarms) until the irritation has subsided.  Dead hairs will begin to shed 5-30 days after your treatment.  Stubble, representing dead hair being shed from the hair follicle, will appear within 5-30 days from the treatment date. that is normal & they will fall out quickly.  Exfoliate to speed up hair shedding.  Anywhere from 5-30 days after the treatment, shedding of the hair may occur & this may appear as new hair growth. It is not new hair growth, but the dead hair pushing its way out of the follicle. You can help the hair come out by using light exfoliation with a washcloth  & shaving.  Avoid the sun.  Avoid sun exposure to reduce the chance of dark or light spots for 2 months. Use sunscreen (spf 30 or higher) at all times throughout the treatment period & for 1-2 months following.  Do not pick/scratch/wax/thread/tweeze the area.  Avoid picking or scratching the treated skin. do not use any other hair removal methods or products, other than shaving, on the treated area during the course of your laser  treatments, as it will prevent you from achieving the best results.  Hair growth varies.  On average, most individuals will be pleased with hair reduction after around 6, up to 9 sessions

## 2024-01-17 NOTE — PROGRESS NOTES
"[facility] Clinic Note     Patient Name: Sixto Noland  Encounter Date: 01/17/2024     Have you been cared for by a [facility] Dermatologist in the last 3 years and, if so, which description applies to you?    Yes.  I have been here within the last 3 years, and my medical history has NOT changed since that time.  I am FEMALE/of child-bearing potential.    REVIEW OF SYSTEMS:  Have you recently had or currently have any of the following? No changes in my recent health.   PAST MEDICAL HISTORY:  Have you personally ever had or currently have any of the following?  If \"YES,\" then please provide more detail. No changes in my medical history.   HISTORY OF IMMUNOSUPPRESSION: Do you have a history of any of the following:  Systemic Immunosuppression such as Diabetes, Biologic or Immunotherapy, Chemotherapy, Organ Transplantation, Bone Marrow Transplantation?  No     Answering \"YES\" requires the addition of the dotphrase \"IMMUNOSUPPRESSED\" as the first diagnosis of the patient's visit.   FAMILY HISTORY:  Any \"first degree relatives\" (parent, brother, sister, or child) with the following?    No changes in my family's known health.   PATIENT EXPERIENCE:    Do you want the Dermatologist to perform a COMPLETE skin exam today including a clinical examination under the \"bra and underwear\" areas?  NO  If necessary, do we have your permission to call and leave a detailed message on your Preferred Phone number that includes your specific medical information?  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    Pistachio Nut (Diagnostic) - Food Allergy Anaphylaxis and Hives      Current Outpatient Medications:     EPINEPHrine (EPIPEN) 0.3 mg/0.3 mL SOAJ, Inject 0.3 mL (0.3 mg total) into a muscle once for 1 dose As needed for anaphylactic reaction to nuts, Disp: 0.3 mL, Rfl: 0    hydroquinone 4 % cream, Apply to face once daily for 3 months, then " take 1 month break., Disp: 30 g, Rfl: 3    norgestimate-ethinyl estradiol (ORTHO-CYCLEN) 0.25-35 MG-MCG per tablet, Take 1 tablet by mouth daily, Disp: 168 tablet, Rfl: 1          Whom besides the patient is providing clinical information about today's encounter?   NO ADDITIONAL HISTORIAN (patient alone provided history)    Physical Exam and Assessment/Plan by Diagnosis:    LASER HAIR REMOVAL FOR CHIN, JAWLINE  PSEUDOFOLLICULITIS BARBAE FOLLOW UP     Physical Exam:  Anatomic Location Affected:  Upper lip, jaw, upper neck and chin  Morphological Description:  Visible terminal hair growth on chin, upper lip, jaw, neck with perifollicular erythema and pigmentation  Pertinent Positives:  Pertinent Negatives:     Additional History of Present Condition:  S/P five treatments to all three areas. Still some hair growth on upper lip and chin. Discussed needing more treatments to these areas. Discussed doing all three areas or just upper lip and chin.      Assessment and Plan:  Based on a thorough discussion of this condition and the management approach to it (including a comprehensive discussion of the known risks, side effects and potential benefits of treatment), the patient (family) agrees to implement the following specific plan:  - Pt would like to pursue treatment for the chin and upper lip, will plan to schedule for future visits.  - Discussed to remember to use sun screen SPF 30+ daily.      Discussed out of pocket:  Laser Hair removal/Chin $125   Laser Hair removal Upper Lip $125     Quote: Two areas (upper lip, chin):Total $1000 for buy 4, get 1 treatment. Total of 5 treatments.    LASER-HAIR REDUCTION INSTRUCTIONS     Prep-Care (what to do before your appointment)     Area must be shaved 24 hours prior to your appointment.  The closer the shave the better.   For bikini services, don’t shave the part where you want to keep the hair.  No makeup/lotion/deodorant on the day of your appointment (on treatment  area).  Stay out of direct sunlight for at least 3 days prior to your appointment (& 3 days after).  Do not use self-kary or spray tan products for at least 2 weeks before your treatment to avoid potential injury.  Avoid drinking more than 2 alcoholic beverages 24 hours before your treatment .  Avoid waxing/threading/tweezing in the area for at least 4 weeks. Shaving is ok!     Post-Care     Redness & Bumps are normal.  Immediately after your treatment, redness & bumps at the treatment area are common; these may last up to 2 hours or longer. It is normal for the treated area to feel like a sunburn for a few hours. You should use a cold compress if the sensitivity continues. If there is any crusting, apply an antibiotic cream. Darker pigmented skin may have more discomfort than lighter skin & may persist longer.  Cleanse the area treated gently.  The treated area may be washed gently with a mild soap. Skin should be patted dry & not rubbed during the first 48 hours.  No makeup & lotion/moisturizer/deodorant for the first 24 hours.  Keep the treated area clean & dry, if further redness or irritation persists, skip your makeup & moisturizer, & deodorant (for underarms) until the irritation has subsided.  Dead hairs will begin to shed 5-30 days after your treatment.  Stubble, representing dead hair being shed from the hair follicle, will appear within 5-30 days from the treatment date. that is normal & they will fall out quickly.  Exfoliate to speed up hair shedding.  Anywhere from 5-30 days after the treatment, shedding of the hair may occur & this may appear as new hair growth. It is not new hair growth, but the dead hair pushing its way out of the follicle. You can help the hair come out by using light exfoliation with a washcloth  & shaving.  Avoid the sun.  Avoid sun exposure to reduce the chance of dark or light spots for 2 months. Use sunscreen (spf 30 or higher) at all times throughout the treatment period &  for 1-2 months following.  Do not pick/scratch/wax/thread/tweeze the area.  Avoid picking or scratching the treated skin. do not use any other hair removal methods or products, other than shaving, on the treated area during the course of your laser treatments, as it will prevent you from achieving the best results.  Hair growth varies.  On average, most individuals will be pleased with hair reduction after around 6, up to 9 sessions    SEBORRHEIC DERMATITIS     Physical Exam:  Anatomic Location Affected &  Morphological Description:  Greasy adherant scale on the scalp  Pertinent Positives:  Pertinent Negatives:    Additional History of Present Condition:  Pt notes increased scaling on the scalp on and off for months to years. Has used OTC antidandruff shampoos. Denies much itch. Sometimes more flared than she is today.    Assessment and Plan:  Based on a thorough discussion of this condition and the management approach to it (including a comprehensive discussion of the known risks, side effects and potential benefits of treatment), the patient (family) agrees to implement the following specific plan:           SCALP  Start OTC anti-dandruff shampoo (Head & Shoulders, Selsun Blue, Neutrogena T-gel or T-Sal, Dove anti-dandruff shampoo are all good options) 2x/week to damp scalp, let sit 10 minutes then rinse (may use normal shampoo/conditioner thereafter as desired)  Scribe Attestation      I,:  Stacy Ramírez am acting as a scribe while in the presence of the attending physician.:       I,:  Agata Perera MD personally performed the services described in this documentation    as scribed in my presence.:

## 2024-02-02 ENCOUNTER — OFFICE VISIT (OUTPATIENT)
Dept: FAMILY MEDICINE CLINIC | Facility: CLINIC | Age: 19
End: 2024-02-02
Payer: COMMERCIAL

## 2024-02-02 VITALS
WEIGHT: 123.6 LBS | HEART RATE: 74 BPM | SYSTOLIC BLOOD PRESSURE: 118 MMHG | RESPIRATION RATE: 16 BRPM | BODY MASS INDEX: 22.74 KG/M2 | HEIGHT: 62 IN | TEMPERATURE: 98.5 F | DIASTOLIC BLOOD PRESSURE: 58 MMHG

## 2024-02-02 DIAGNOSIS — H61.21 IMPACTED CERUMEN OF RIGHT EAR: Primary | ICD-10-CM

## 2024-02-02 PROCEDURE — 99213 OFFICE O/P EST LOW 20 MIN: CPT | Performed by: FAMILY MEDICINE

## 2024-02-02 PROCEDURE — 69209 REMOVE IMPACTED EAR WAX UNI: CPT | Performed by: FAMILY MEDICINE

## 2024-02-02 NOTE — PROGRESS NOTES
Name: Sixto Noland      : 2005      MRN: [id number]  Encounter Provider: Teri Rutherford MD  Encounter Date: 2024   Encounter department: [facility]    Assessment & Plan     1. Impacted cerumen of right ear         Ear cerumen removal    Date/Time: 2024 3:15 PM    Performed by: Teri Rutherford MD  Authorized by: Teri Rutherford MD  Universal Protocol:  Consent: Verbal consent obtained.  Consent given by: patient    Patient location:  Clinic  Procedure details:     Local anesthetic:  None    Location:  R ear    Procedure type: irrigation only      Approach:  External  Post-procedure details:     Complication:  None    Hearing quality:  Improved    Patient tolerance of procedure:  Tolerated well, no immediate complications      Subjective      Ear Fullness   There is pain in the right ear. This is a new problem. Episode onset: 5 days. The problem occurs constantly. The problem has been unchanged. There has been no fever. Associated symptoms include hearing loss. Pertinent negatives include no abdominal pain, coughing, diarrhea, ear discharge, headaches, neck pain, rash, rhinorrhea, sore throat or vomiting. Treatments tried: debrox ear wax drops. The treatment provided no relief.     Review of Systems   HENT:  Positive for hearing loss. Negative for ear discharge, rhinorrhea and sore throat.    Respiratory:  Negative for cough.    Gastrointestinal:  Negative for abdominal pain, diarrhea and vomiting.   Musculoskeletal:  Negative for neck pain.   Skin:  Negative for rash.   Neurological:  Negative for headaches.       Current Outpatient Medications on File Prior to Visit   Medication Sig    EPINEPHrine (EPIPEN) 0.3 mg/0.3 mL SOAJ Inject 0.3 mL (0.3 mg total) into a muscle once for 1 dose As needed for anaphylactic reaction to nuts    hydroquinone 4 % cream Apply to face once daily for 3 months, then take 1 month break.    norgestimate-ethinyl estradiol (ORTHO-CYCLEN) 0.25-35 MG-MCG per tablet  "Take 1 tablet by mouth daily       Objective     /58   Pulse 74   Temp 98.5 °F (36.9 °C)   Resp 16   Ht 5' 1.5\" (1.562 m)   Wt 56.1 kg (123 lb 9.6 oz)   LMP 02/02/2024 (Exact Date)   BMI 22.98 kg/m²     Physical Exam  Constitutional:       General: She is not in acute distress.     Appearance: She is well-developed. She is not diaphoretic.   HENT:      Head: Normocephalic and atraumatic.      Right Ear: There is impacted cerumen.      Left Ear: There is impacted cerumen.   Eyes:      General: No scleral icterus.        Right eye: No discharge.         Left eye: No discharge.      Conjunctiva/sclera: Conjunctivae normal.   Pulmonary:      Effort: Pulmonary effort is normal.   Musculoskeletal:      Cervical back: Normal range of motion.   Skin:     General: Skin is warm.   Neurological:      Mental Status: She is alert and oriented to person, place, and time.   Psychiatric:         Behavior: Behavior normal.         Thought Content: Thought content normal.         Judgment: Judgment normal.       Teri Rutherford MD    "

## 2024-02-07 ENCOUNTER — PROCEDURE VISIT (OUTPATIENT)
Dept: DERMATOLOGY | Facility: CLINIC | Age: 19
End: 2024-02-07

## 2024-02-07 DIAGNOSIS — L73.1 PSEUDOFOLLICULITIS BARBAE: ICD-10-CM

## 2024-02-07 DIAGNOSIS — Z41.1 ENCOUNTER FOR COSMETIC LASER PROCEDURE: Primary | ICD-10-CM

## 2024-02-07 PROCEDURE — LHRCHIN4: Performed by: STUDENT IN AN ORGANIZED HEALTH CARE EDUCATION/TRAINING PROGRAM

## 2024-02-07 PROCEDURE — LHRLIP4: Performed by: STUDENT IN AN ORGANIZED HEALTH CARE EDUCATION/TRAINING PROGRAM

## 2024-02-07 NOTE — PROGRESS NOTES
"[facility]at[facility] Clinic Note     Patient Name: Sixto Noland  Encounter Date: 02/07/2024     Have you been cared for by a [facility] Dermatologist in the last 3 years and, if so, which description applies to you?    Yes.  I have been here within the last 3 years, and my medical history has NOT changed since that time.  I am FEMALE/of child-bearing potential.    REVIEW OF SYSTEMS:  Have you recently had or currently have any of the following? No changes in my recent health.   PAST MEDICAL HISTORY:  Have you personally ever had or currently have any of the following?  If \"YES,\" then please provide more detail. No changes in my medical history.   HISTORY OF IMMUNOSUPPRESSION: Do you have a history of any of the following:  Systemic Immunosuppression such as Diabetes, Biologic or Immunotherapy, Chemotherapy, Organ Transplantation, Bone Marrow Transplantation?  No     Answering \"YES\" requires the addition of the dotphrase \"IMMUNOSUPPRESSED\" as the first diagnosis of the patient's visit.   FAMILY HISTORY:  Any \"first degree relatives\" (parent, brother, sister, or child) with the following?    No changes in my family's known health.   PATIENT EXPERIENCE:    Do you want the Dermatologist to perform a COMPLETE skin exam today including a clinical examination under the \"bra and underwear\" areas?  NO  If necessary, do we have your permission to call and leave a detailed message on your Preferred Phone number that includes your specific medical information?  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    Pistachio Nut (Diagnostic) - Food Allergy Anaphylaxis and Hives      Current Outpatient Medications:     EPINEPHrine (EPIPEN) 0.3 mg/0.3 mL SOAJ, Inject 0.3 mL (0.3 mg total) into a muscle once for 1 dose As needed for anaphylactic reaction to nuts, Disp: 0.3 mL, Rfl: 0    hydroquinone 4 % cream, Apply to face once daily for 3 months, then " take 1 month break., Disp: 30 g, Rfl: 3    norgestimate-ethinyl estradiol (ORTHO-CYCLEN) 0.25-35 MG-MCG per tablet, Take 1 tablet by mouth daily, Disp: 168 tablet, Rfl: 1          Whom besides the patient is providing clinical information about today's encounter?   NO ADDITIONAL HISTORIAN (patient alone provided history)    Physical Exam and Assessment/Plan by Diagnosis:    Nd:YAG Laser Treatment    2/7/2024  Device: Takes Pro  Place of Treatment: [facility]    Surgeon and : Agata Perera MD  Assistant: Stacy Ramírez    Treatment number: 1 of 5, (second package of 5)  Site of treatment: chin and upper lip  Skin type: Hernandez III    Pre-operative Diagnosis: Pseudofolliculitis Barbae  Indications for Procedure:  same as above  Post-operative Diagnosis: same as pre-operative    Anesthetic: Sid cooler     Safety Precautions:  Fire extinguisher persent/Window covered/Staff and patient eyes covered/Warning sign posted     Interim History/Comments:  none  Percent Improvement: N/A    Parameters: Laser was set to 1064nm    Fluence: 18 J/cm2  Pulse duration: 10 msec  Spot size: 18 mm  Pulse count: 105     Procedure Note:   After discussing potential procedure related risks including but not limited to pain, purpura, blistering, scarring, discoloration, “footprinting,” recurrence, inefficacy, need for additional treatment, and undesirable cosmetic results, written and verbal consent were obtained.  Patient was brought back to the operating room. Time out was performed.  Patient's name, identification and intended procedure were verified. Prior to the procedure, the patient cleansed the treatment area. The treatment area was re-cleansed with alcohol. Eye coverings eye shield inserted/placed.     The cooling device was set to 40 msec msec pre / 20 msec delay/       Tolerance: Patient tolerated the procedure well with no immediate significant complications and left in stable condition.    Complications: none  Other procedures: none  Photography: yes          Post-op Care: Aftercare was discussed. Continue to ice at home and keep the area moist with a gentle moisturizer. Advised the patient to avoid exercise for the next 24 hours and also to be cautious with sun exposure over the next week. Advised patient to call the office if there is excessive swelling.   Follow-up:  Patient is aware that it will take multiple treatments to treat this condition. Return to clinic for re-treatment in 4-6 weeks.    THIS WAS A COSMETIC TREATMENT AND PATIENT PAID OUT OF POCKET, DO NOT BILL INSURANCE.   AMOUNT PAID: $1000.00 for a total of 5 treatments.  ( package of 4 treatments - buy 4 get 1 treatment ) Today is treatment number 1 of 5.        Scribe Attestation      I,:  Stacy Ramírez am acting as a scribe while in the presence of the attending physician.:       I,:  Agata Perera MD personally performed the services described in this documentation    as scribed in my presence.:

## 2024-02-07 NOTE — PATIENT INSTRUCTIONS
LASER HAIR REDUCTION PATIENT INSTRUCTIONS    PRE-TREATMENT INSTRUCTIONS    Avoid the sun 4-6 weeks before and after treatment or as otherwise directed by your healthcare provider.   Your provider may ask you to stop any topical medications or skin care products 3-5 days prior to treatment  You MUST avoid bleaching, plucking or waxing hair for 4-6 weeks prior to treatment. If you have had a history of perioral or genital herpes simplex virus, your provider may recommend  prophylactic antiviral therapy. Follow the directions for your particular antiviral medication.  If you have a tan or have a darker skin type, a bleaching regimen can be started 4-6 weeks before treatment.  RECENTLY TANNED SKIN CANNOT BE TREATED! If treated within two weeks of active (natural sunlight or tanning hanson) tanning, you may develop white spots after treatment and this may not clear for 2-3 months or longer.  The use of self- tanning skin products must be discontinued one week before treatment. Any residual self- kary should be removed prior to treatment.    INTRA-TREATMENT CARE    The skin is cleaned and shaved prior to treatment. The use of a topical anesthetic is optional.  When treating the upper lip, the teeth can be protected with moist white gauze. The gauze also serves to support the lip during treatment, allowing a surface to push against.  The DCD (cryogen cooling device), will be used with the laser to cool the skin during treatment.  Safety considerations are important during the laser procedure. Laser safety protective eye wear MUST be worn by the patient and all personnel in the treatment room during the procedure to reduce the chance of damage to the eye. In addition, your provider will take all necessary precautions to ensure your safety.    POST-TREATMENT CARE    Immediately after treatment, there should be redness and edema swelling of each hair follicle in the treatment site, which may last up to two hours, or longer.  The erythema may last up to 2-3 days. Th e treated area will feel like a sunburn for a few hours after treatment. You can use topical hydrocortisone or Benadryl to alleviate the discomfort. An oral antihistamine can also be used. Non-drowsy antihistamine (such as Claritin) can be taken during the day to avoid the drowsiness associated with Benadryl.  Your provider may use an optional  ling method after treatment to ensure your comfort.  A topical soothing skin care product, such as aloe vera gel, can be applied following treatment if desired.  Makeup can be used immediately after the treatment, as long as the skin is not irritated.  Avoid sun exposure to reduce the chance of hyperpigmentation (darker pigmentation).  Use a sunblock (SPF 30+) at all times throughout the course of treatment.  Avoid picking or scratching the treated skin. After the laser treatment is performed, do not use any other hair removal treatment products or similar treatments (waxing, electrolysis or tweezing) that will disturb the hair follicle in the treatment area for 4-6 weeks. Shaving is the preferred method.  Anywhere from 10-21 days after the treatment, shedding of the treated hair may occur and this appears as new hair growth. This is NOT new hair growth. You can clean and remove the hair by washing or wiping the area with a wet cloth or Loofa sponge.  After the underarms are treated, you may wish to use a powder instead of a deodorant for 24 hours after the treatment to reduce skin irritation.  There are no restrictions on bathing except to treat the skin gently for the first 24 hours, as if you had a sunburn.  Return to the office or call for an appointment at the first sign of the return of hair growth. This may be within 4-6 weeks for the upper body and possibly as long as 2-3 months for the lower body. Hair re-growth occurs at different rates on different areas of the body. New hair growth will not occur for AT LEAST three weeks  after treatment.  Call us at [phone] with any questions or concerns you may have.

## 2024-03-20 ENCOUNTER — COSMETIC (OUTPATIENT)
Dept: DERMATOLOGY | Facility: CLINIC | Age: 19
End: 2024-03-20

## 2024-03-20 DIAGNOSIS — Z41.1 ENCOUNTER FOR COSMETIC LASER PROCEDURE: Primary | ICD-10-CM

## 2024-03-20 PROCEDURE — BUNDLE PR BUNDLE: Performed by: STUDENT IN AN ORGANIZED HEALTH CARE EDUCATION/TRAINING PROGRAM

## 2024-03-20 NOTE — PROGRESS NOTES
"[facility] Dermatology Clinic Note     Patient Name: Sixto Noland  Encounter Date: 3/20/24     Have you been cared for by a [facility] Dermatologist in the last 3 years and, if so, which description applies to you?    Yes.  I have been here within the last 3 years, and my medical history has NOT changed since that time.  I am FEMALE/of child-bearing potential.    REVIEW OF SYSTEMS:  Have you recently had or currently have any of the following? No changes in my recent health.   PAST MEDICAL HISTORY:  Have you personally ever had or currently have any of the following?  If \"YES,\" then please provide more detail. No changes in my medical history.   HISTORY OF IMMUNOSUPPRESSION: Do you have a history of any of the following:  Systemic Immunosuppression such as Diabetes, Biologic or Immunotherapy, Chemotherapy, Organ Transplantation, Bone Marrow Transplantation?  No     Answering \"YES\" requires the addition of the dotphrase \"IMMUNOSUPPRESSED\" as the first diagnosis of the patient's visit.   FAMILY HISTORY:  Any \"first degree relatives\" (parent, brother, sister, or child) with the following?    No changes in my family's known health.   PATIENT EXPERIENCE:    Do you want the Dermatologist to perform a COMPLETE skin exam today including a clinical examination under the \"bra and underwear\" areas?  NO  If necessary, do we have your permission to call and leave a detailed message on your Preferred Phone number that includes your specific medical information?  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    Pistachio Nut (Diagnostic) - Food Allergy Anaphylaxis and Hives      Current Outpatient Medications:     EPINEPHrine (EPIPEN) 0.3 mg/0.3 mL SOAJ, Inject 0.3 mL (0.3 mg total) into a muscle once for 1 dose As needed for anaphylactic reaction to nuts, Disp: 0.3 mL, Rfl: 0    hydroquinone 4 % cream, Apply to face once daily for 3 months, then take " 1 month break., Disp: 30 g, Rfl: 3    norgestimate-ethinyl estradiol (ORTHO-CYCLEN) 0.25-35 MG-MCG per tablet, Take 1 tablet by mouth daily (Patient not taking: Reported on 2/7/2024), Disp: 168 tablet, Rfl: 1          Whom besides the patient is providing clinical information about today's encounter?   NO ADDITIONAL HISTORIAN (patient alone provided history)    Physical Exam and Assessment/Plan by Diagnosis:    Nd:YAG Laser Treatment    3/20/2024  Device: Rosalba Gentle Max Pro  Place of Treatment: [facility]    Surgeon and : Agata Perera MD  Assistant: Refugio Sampson MD    Treatment number: 2 of 5 (2nd package)  Site of treatment: upper lip, chin  Skin type: Hernandez 4    Pre-operative Diagnosis: hirsutism  Indications for Procedure:  cosmesis  Post-operative Diagnosis: same as pre-operative    Anesthetic: Sid cooler     Safety Precautions:  Fire extinguisher persent/Window covered/Staff and patient eyes covered/Warning sign posted     Interim History/Comments: improvement in hair on jaw, persistent hair on upper lip and chin  Percent Improvement:  50%    Parameters: Laser was set to 1064nm    Fluence: 18 J/cm2  Pulse duration: 10 msec  Spot size: 18 mm  Pulse count: 105     Procedure Note:   After discussing potential procedure related risks including but not limited to pain, purpura, blistering, scarring, discoloration, “footprinting,” recurrence, inefficacy, need for additional treatment, and undesirable cosmetic results, written and verbal consent were obtained.  Patient was brought back to the operating room. Time out was performed.  Patient's name, identification and intended procedure were verified. Prior to the procedure, the patient cleansed the treatment area. The treatment area was re-cleansed with alcohol. Eye coverings eye shield inserted/placed.     The cooling device was set to 50 msec msec pre / 30 msec delay/       Tolerance: Patient tolerated the procedure well with no  immediate significant complications and left in stable condition.   Complications: none  Other procedures: none  Photography: yes    Post-op Care: Aftercare was discussed. Continue to ice at home and keep the area moist with a gentle moisturizer. Advised the patient to avoid exercise for the next 24 hours and also to be cautious with sun exposure over the next week. Advised patient to call the office if there is excessive swelling.   Follow-up:  Patient is aware that it will take multiple treatments to treat this condition. Return to clinic for re-treatment in 4-6 weeks.      THIS WAS A COSMETIC TREATMENT AND PATIENT PAID OUT OF POCKET, DO NOT BILL INSURANCE.   AMOUNT PAID: $1000 at last visit. No payment due today.

## 2024-04-22 ENCOUNTER — PROCEDURE VISIT (OUTPATIENT)
Dept: DERMATOLOGY | Facility: CLINIC | Age: 19
End: 2024-04-22

## 2024-04-22 DIAGNOSIS — Z41.1 ENCOUNTER FOR COSMETIC LASER PROCEDURE: Primary | ICD-10-CM

## 2024-04-22 DIAGNOSIS — L68.0 HIRSUTISM: ICD-10-CM

## 2024-04-22 PROCEDURE — BUNDLE PR BUNDLE: Performed by: STUDENT IN AN ORGANIZED HEALTH CARE EDUCATION/TRAINING PROGRAM

## 2024-04-22 NOTE — PROGRESS NOTES
"[facility] Dermatology Clinic Note     Patient Name: Sixto Noland  Encounter Date: 4/22/2024     Have you been cared for by a [facility] Dermatologist in the last 3 years and, if so, which description applies to you?    Yes.  I have been here within the last 3 years, and my medical history has NOT changed since that time.  I am FEMALE/of child-bearing potential.    REVIEW OF SYSTEMS:  Have you recently had or currently have any of the following? No changes in my recent health.   PAST MEDICAL HISTORY:  Have you personally ever had or currently have any of the following?  If \"YES,\" then please provide more detail. No changes in my medical history.   HISTORY OF IMMUNOSUPPRESSION: Do you have a history of any of the following:  Systemic Immunosuppression such as Diabetes, Biologic or Immunotherapy, Chemotherapy, Organ Transplantation, Bone Marrow Transplantation?  No     Answering \"YES\" requires the addition of the dotphrase \"IMMUNOSUPPRESSED\" as the first diagnosis of the patient's visit.   FAMILY HISTORY:  Any \"first degree relatives\" (parent, brother, sister, or child) with the following?    No changes in my family's known health.   PATIENT EXPERIENCE:    Do you want the Dermatologist to perform a COMPLETE skin exam today including a clinical examination under the \"bra and underwear\" areas?  NO  If necessary, do we have your permission to call and leave a detailed message on your Preferred Phone number that includes your specific medical information?  NO      Allergies   Allergen Reactions    Codeine Hives and Shortness Of Breath    Nuts - Food Allergy Anaphylaxis and Hives    Pistachio Nut (Diagnostic) - Food Allergy Anaphylaxis and Hives      Current Outpatient Medications:     EPINEPHrine (EPIPEN) 0.3 mg/0.3 mL SOAJ, Inject 0.3 mL (0.3 mg total) into a muscle once for 1 dose As needed for anaphylactic reaction to nuts, Disp: 0.3 mL, Rfl: 0    hydroquinone 4 % cream, Apply to face once daily for 3 months, then " take 1 month break., Disp: 30 g, Rfl: 3    norgestimate-ethinyl estradiol (ORTHO-CYCLEN) 0.25-35 MG-MCG per tablet, Take 1 tablet by mouth daily (Patient not taking: Reported on 2/7/2024), Disp: 168 tablet, Rfl: 1        Whom besides the patient is providing clinical information about today's encounter?   NO ADDITIONAL HISTORIAN (patient alone provided history)    Physical Exam and Assessment/Plan by Diagnosis:    Nd:YAG Laser Treatment     3/20/2024  Device: Strikingly Pro  Place of Treatment: [facility]     Surgeon and : Agata Perera MD  Assistant: Refugio Sampson MD     Treatment number: 3 of 5 (2nd package)  Site of treatment: upper lip, chin  Skin type: Hernandez 4     Pre-operative Diagnosis: hirsutism  Indications for Procedure:  cosmesis  Post-operative Diagnosis: same as pre-operative     Anesthetic: Sid cooler     Safety Precautions:  Fire extinguisher persent/Window covered/Staff and patient eyes covered/Warning sign posted     Interim History/Comments:pt reports fast growing with no decrease in density on chin area. Doing well on sideburns. Still with hair on upper lip.  Percent Improvement:  50%     Parameters: Laser was set to 1064nm     Fluence: 16 J/cm2 (dropped down from last visit given a few areas of hyperpigmentation noted)  Pulse duration: 10 msec  Spot size: 18 mm  Pulse count: 113      Procedure Note:   After discussing potential procedure related risks including but not limited to pain, purpura, blistering, scarring, discoloration, “footprinting,” recurrence, inefficacy, need for additional treatment, and undesirable cosmetic results, written and verbal consent were obtained.  Patient was brought back to the operating room. Time out was performed.  Patient's name, identification and intended procedure were verified. Prior to the procedure, the patient cleansed the treatment area. The treatment area was re-cleansed with alcohol. Eye coverings eye shield  inserted/placed.      The cooling device was set to 40 msec msec pre / 20 msec delay/       Tolerance: Patient tolerated the procedure well with no immediate significant complications and left in stable condition.   Complications: none  Other procedures: none  Photography: yes     Post-op Care: Aftercare was discussed. Continue to ice at home and keep the area moist with a gentle moisturizer. Advised the patient to avoid exercise for the next 24 hours and also to be cautious with sun exposure over the next week. Advised patient to call the office if there is excessive swelling. Advised hypochlous acid to face   Follow-up:  Patient is aware that it will take multiple treatments to treat this condition. Return to clinic for re-treatment in ~6 weeks.Discussed spacing out more thatn Q4 weeks asa this hair has not shown as much of a response to allow for cycling of hair follicles into growth phase.              THIS WAS A COSMETIC TREATMENT AND PATIENT PAID OUT OF POCKET, DO NOT BILL INSURANCE.   AMOUNT PAID: $1000 paid in full at start of package. This is treatment 3 of 5. No payment due today.     Hx of this package:     Purchased a second package for chin and upper lip, paid $1000.00 for package of 5 treatments total.   Tx 1 of 5: 02/07/24  Tx 2 of 5: 03/20/24   Tx 3 of 5: 04/22/24     Scribe Attestation      I,:  Eveline Engel am acting as a scribe while in the presence of the attending physician.:       I,:  Agata Perear MD personally performed the services described in this documentation    as scribed in my presence.:

## 2024-06-03 ENCOUNTER — COSMETIC (OUTPATIENT)
Dept: DERMATOLOGY | Facility: CLINIC | Age: 19
End: 2024-06-03

## 2024-06-03 DIAGNOSIS — L70.0 ACNE VULGARIS: ICD-10-CM

## 2024-06-03 DIAGNOSIS — L68.0 HIRSUTISM: Primary | ICD-10-CM

## 2024-06-03 RX ORDER — CLINDAMYCIN PHOSPHATE 10 UG/ML
LOTION TOPICAL 2 TIMES DAILY
Qty: 60 ML | Refills: 2 | Status: SHIPPED | OUTPATIENT
Start: 2024-06-03

## 2024-06-03 NOTE — PROGRESS NOTES
"[facility] Dermatology Clinic Note     Patient Name: Sixto Noland  Encounter Date: 6/3/2024     Have you been cared for by a [facility] Dermatologist in the last 3 years and, if so, which description applies to you?    Yes.  I have been here within the last 3 years, and my medical history has NOT changed since that time.  I am FEMALE/of child-bearing potential.    REVIEW OF SYSTEMS:  Have you recently had or currently have any of the following? No changes in my recent health.   PAST MEDICAL HISTORY:  Have you personally ever had or currently have any of the following?  If \"YES,\" then please provide more detail. No changes in my medical history.   HISTORY OF IMMUNOSUPPRESSION: Do you have a history of any of the following:  Systemic Immunosuppression such as Diabetes, Biologic or Immunotherapy, Chemotherapy, Organ Transplantation, Bone Marrow Transplantation?  No     Answering \"YES\" requires the addition of the dotphrase \"IMMUNOSUPPRESSED\" as the first diagnosis of the patient's visit.   FAMILY HISTORY:  Any \"first degree relatives\" (parent, brother, sister, or child) with the following?    No changes in my family's known health.   PATIENT EXPERIENCE:    Do you want the Dermatologist to perform a COMPLETE skin exam today including a clinical examination under the \"bra and underwear\" areas?  NO  If necessary, do we have your permission to call and leave a detailed message on your Preferred Phone number that includes your specific medical information?  NO      Allergies   Allergen Reactions    Codeine Hives and Shortness Of Breath    Nuts - Food Allergy Anaphylaxis and Hives    Pistachio Nut (Diagnostic) - Food Allergy Anaphylaxis and Hives      Current Outpatient Medications:     EPINEPHrine (EPIPEN) 0.3 mg/0.3 mL SOAJ, Inject 0.3 mL (0.3 mg total) into a muscle once for 1 dose As needed for anaphylactic reaction to nuts, Disp: 0.3 mL, Rfl: 0    hydroquinone 4 % cream, Apply to face once daily for 3 months, then take " "1 month break., Disp: 30 g, Rfl: 3    norgestimate-ethinyl estradiol (ORTHO-CYCLEN) 0.25-35 MG-MCG per tablet, Take 1 tablet by mouth daily (Patient not taking: Reported on 2/7/2024), Disp: 168 tablet, Rfl: 1        Whom besides the patient is providing clinical information about today's encounter?   NO ADDITIONAL HISTORIAN (patient alone provided history)        History of Present Condition:  18 y.o. female presents to do laser hair removal on chin, jawline and sideburns. Notes she does get acne flare ups on chin after shavign for [facility]. She has had acne for several year(s). Current and past treatments used: none.    Denies sun screen use after laser. Using lightening cream that contains hydroquinone currently.    ACNE VULGARIS (\"COMMON ACNE\")  Post Inflammatory Hyperpigmentation    Physical Exam:  Anatomic Location Affected & Morphological Description: inflamed papule with hemorrhagic crusting on chin with lightly hyperpigmented patches in location of laser treatment  Pertinent Positives:  Pertinent Negatives:    Discussed that treatment is directed at improving skin appearance and reducing the likelihood of scarring. Discussed theraputic ladder including topical OTC treatments, topical prescriptions, and oral medications. Discussed side effects as noted below.     Plan today:     Defer laser given PIH. Discussed need for strict sun protection after lasering and in between laser sessions. Advised regular use of SPF and hydroquinone lightening cream as previously discussed noted need for breaks with this topical after 3 months use advise 1 month break given risk of paradoxical darkening.    AM:  - Start gentle wash or Cln or other hypochlorous acid spray.  - Start clindamycin 1% lotion to affected skin. Use after shaving as well.   - SPF 30 or greater (can be a lotion with SPF like CeraVe AM)  - Start non-comedogenic moisturizer such as CeraVe, Cetaphil or Vanicream.      PM:  - Gentle cleanser, such as CeraVe, " "Cetaphil or La Roche Posay  - Start (Adapalene 0.1% gel, Tretinoin 0.025%) qHS. Educated that this medication can be drying and irritating. Start by applying a pea-sized amount of product 2 nights per week, then increase to nightly as tolerated. Written instructions provided.  - Start non-comedogenic moisturizer such as CeraVe, Cetaphil or Vanicream. May use on top of retinoid. If retinoid is too drying, may employ the \"sandwich method.\" To do this, apply layer of non-comedogenic moisturizer, followed by layer of retinoid, followed by another layer of non-comedogenic moisturizer.     Call with any questions or concerns before next follow-up visit; do not stop medications abruptly without consulting provider.    COMMON POSSIBLE SIDE EFFECTS OF MEDICATIONS    Retinoids - dryness, redness, increased sun sensitivity.  Benzoyl peroxide - drying, redness, bleaching of clothes, towels and sheets, allergy.  Doxycycline - headaches; dizziness; irritation of the throat; nail changes; discoloration of teeth.  Sun sensitivity - even if you have dark skin, this medicine can make you burn more easily.  Make sure you protect yourself from the sun, either by avoiding being outside between 11 AM and 3 PM, wearing and reapplying sunscreen/sunblock, or wearing sun protective clothing  Nausea/vomiting - if you experience nausea with this medication, take it with food.    WHEN AND WHERE TO CALL WITH CONCERNS  We are here to help!  If you experience any unusual symptoms, then stop taking or using the medication and call our office at [phone] (SKIN).  It is better to be safe than to be sorry!\                 Scribe Attestation      I,:  Eveline Engel am acting as a scribe while in the presence of the attending physician.:       I,:  Agata Perera MD personally performed the services described in this documentation    as scribed in my presence.:             "

## 2024-08-05 ENCOUNTER — OFFICE VISIT (OUTPATIENT)
Dept: DERMATOLOGY | Facility: CLINIC | Age: 19
End: 2024-08-05

## 2024-08-05 DIAGNOSIS — L68.0 HIRSUTISM: Primary | ICD-10-CM

## 2024-08-05 PROCEDURE — BUNDLE PR BUNDLE: Performed by: STUDENT IN AN ORGANIZED HEALTH CARE EDUCATION/TRAINING PROGRAM

## 2024-08-05 NOTE — PROGRESS NOTES
Nd:YAG Laser Treatment    8/5/2024  Device: Rosalba Gentle Max Pro  Place of Treatment: [facility]    Surgeon and : Agata Perera MD  Assistant: Marjorie CAROLINA     Treatment number: 4 of 5 (second package)  Site of treatment: upper lip, sideburns, chin and neck  Skin type: Hernandez IV    Pre-operative Diagnosis: hirsutism  Indications for Procedure:  cosmesis  Post-operative Diagnosis: same as pre-operative    Anesthetic: Sid cooler     Safety Precautions:  Fire extinguisher persent/Window covered/Staff and patient eyes covered/Warning sign posted     Interim History/Comments:  Pt's last treatment was in 4/22. Has been using HQ-containing lightening cream since that visit   Percent Improvement:  60%    Parameters: Laser was set to 1064nm    Fluence: 16 J/cm2 (kept at this given still with a few areas of hyperpigmentation noted)  Pulse duration: 10 msec  Spot size: 18 mm    Procedure Note:   After discussing potential procedure related risks including but not limited to pain, purpura, blistering, scarring, discoloration, “footprinting,” recurrence, inefficacy, need for additional treatment, and undesirable cosmetic results, written and verbal consent were obtained.  Patient was brought back to the operating room. Time out was performed.  Patient's name, identification and intended procedure were verified. Prior to the procedure, the patient cleansed the treatment area. The treatment area was re-cleansed with alcohol. Eye coverings eye shield inserted/placed.     The cooling device was set to 40 msec msec pre / 20 msec delay/       Tolerance: Patient tolerated the procedure well with no immediate significant complications and left in stable condition.   Complications: none  Other procedures: none  Photography: no    Post-op Care: Aftercare was discussed. Continue to ice at home and keep the area moist with a gentle moisturizer. Advised the patient to avoid exercise for the next 24 hours and also to  be cautious with sun exposure over the next week. Advised patient to call the office if there is excessive swelling.   Follow-up:  Patient is aware that it will take multiple treatments to treat this condition. Return to clinic for re-treatment in 4-6 weeks.    THIS WAS A COSMETIC TREATMENT AND PATIENT PAID OUT OF POCKET, DO NOT BILL INSURANCE.   AMOUNT PAID: $1000 paid in full at start of package. This is treatment 4 of 5. No payment due today.     Hx of this package:     Purchased a second package for chin and upper lip, paid $1000.00 for package of 5 treatments total.   Tx 1 of 5: 02/07/24  Tx 2 of 5: 03/20/24   Tx 3 of 5: 04/22/24  Tx 4 of 5: 08/05/24

## 2024-08-29 LAB
ALBUMIN SERPL-MCNC: 4.8 G/DL (ref 4–5)
ALP SERPL-CCNC: 122 IU/L (ref 44–121)
ALT SERPL-CCNC: 5 IU/L (ref 0–32)
AST SERPL-CCNC: 17 IU/L (ref 0–40)
BILIRUB SERPL-MCNC: 0.3 MG/DL (ref 0–1.2)
BUN SERPL-MCNC: 8 MG/DL (ref 6–20)
BUN/CREAT SERPL: 12 (ref 9–23)
CALCIUM SERPL-MCNC: 10 MG/DL (ref 8.7–10.2)
CHLORIDE SERPL-SCNC: 102 MMOL/L (ref 96–106)
CHOLEST SERPL-MCNC: 210 MG/DL (ref 100–199)
CO2 SERPL-SCNC: 24 MMOL/L (ref 20–29)
CREAT SERPL-MCNC: 0.66 MG/DL (ref 0.57–1)
EGFR: 128 ML/MIN/1.73
GLOBULIN SER-MCNC: 3 G/DL (ref 1.5–4.5)
GLUCOSE SERPL-MCNC: 83 MG/DL (ref 70–99)
HDLC SERPL-MCNC: 65 MG/DL
LDLC SERPL CALC-MCNC: 126 MG/DL (ref 0–99)
LDLC/HDLC SERPL: 1.9 RATIO (ref 0–3.2)
MICRODELETION SYND BLD/T FISH: NORMAL
POTASSIUM SERPL-SCNC: 4.3 MMOL/L (ref 3.5–5.2)
PROT SERPL-MCNC: 7.8 G/DL (ref 6–8.5)
SL AMB VLDL CHOLESTEROL CALC: 19 MG/DL (ref 5–40)
SODIUM SERPL-SCNC: 140 MMOL/L (ref 134–144)
TRIGL SERPL-MCNC: 109 MG/DL (ref 0–149)

## 2024-08-30 LAB
T4 FREE SERPL-MCNC: 1.24 NG/DL (ref 0.82–1.77)
TSH SERPL DL<=0.005 MIU/L-ACNC: 1.95 UIU/ML (ref 0.45–4.5)

## 2024-09-19 ENCOUNTER — HOSPITAL ENCOUNTER (EMERGENCY)
Facility: HOSPITAL | Age: 19
Discharge: HOME | End: 2024-09-19
Attending: EMERGENCY MEDICINE
Payer: OTHER MISCELLANEOUS

## 2024-09-19 VITALS
OXYGEN SATURATION: 100 % | DIASTOLIC BLOOD PRESSURE: 59 MMHG | BODY MASS INDEX: 23.38 KG/M2 | TEMPERATURE: 97 F | RESPIRATION RATE: 18 BRPM | WEIGHT: 125.8 LBS | HEART RATE: 58 BPM | SYSTOLIC BLOOD PRESSURE: 104 MMHG
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
## 2024-09-19 LAB — ALT SERPL W P-5'-P-CCNC: 6 U/L (ref 7–52)

## 2024-09-19 PROCEDURE — 99284 EMERGENCY DEPT VISIT MOD MDM: CPT | Performed by: EMERGENCY MEDICINE

## 2024-09-19 PROCEDURE — 99283 EMERGENCY DEPT VISIT LOW MDM: CPT

## 2024-09-19 PROCEDURE — 87389 HIV-1 AG W/HIV-1&-2 AB AG IA: CPT | Performed by: EMERGENCY MEDICINE

## 2024-09-19 PROCEDURE — 86706 HEP B SURFACE ANTIBODY: CPT | Performed by: EMERGENCY MEDICINE

## 2024-09-19 PROCEDURE — 84460 ALANINE AMINO (ALT) (SGPT): CPT | Performed by: EMERGENCY MEDICINE

## 2024-09-19 PROCEDURE — 86803 HEPATITIS C AB TEST: CPT | Performed by: EMERGENCY MEDICINE

## 2024-09-19 PROCEDURE — 86704 HEP B CORE ANTIBODY TOTAL: CPT | Performed by: EMERGENCY MEDICINE

## 2024-09-19 PROCEDURE — 87340 HEPATITIS B SURFACE AG IA: CPT | Performed by: EMERGENCY MEDICINE

## 2024-09-19 PROCEDURE — 36415 COLL VENOUS BLD VENIPUNCTURE: CPT | Performed by: EMERGENCY MEDICINE

## 2024-09-19 RX ORDER — EMTRICITABINE AND TENOFOVIR DISOPROXIL FUMARATE 200; 300 MG/1; MG/1
1 TABLET, FILM COATED ORAL DAILY
Qty: 28 TABLET | Refills: 0 | Status: SHIPPED | OUTPATIENT
Start: 2024-09-19

## 2024-09-20 ENCOUNTER — TELEPHONE (OUTPATIENT)
Age: 19
End: 2024-09-20

## 2024-09-20 LAB
HBV CORE AB SER QL: NORMAL
HBV SURFACE AB SER-ACNC: <3 MIU/ML
HBV SURFACE AG SER QL: NORMAL
HCV AB SER QL: NORMAL
HIV 1+2 AB+HIV1 P24 AG SERPL QL IA: NORMAL
HIV 2 AB SERPL QL IA: NORMAL
HIV1 AB SERPL QL IA: NORMAL
HIV1 P24 AG SERPL QL IA: NORMAL

## 2024-09-20 NOTE — ED PROVIDER NOTES
1. Accidental needlestick injury with exposure to body fluid      ED Disposition       ED Disposition   Discharge    Condition   Stable    Date/Time   Thu Sep 19, 2024  9:10 PM    Comment   Sixto Noland discharge to home/self care.                   Assessment & Plan       Medical Decision Making  19-year-old female giving a shot at [facility] flu shot intramuscularly and that she get done she got  stuck on the left index finger.    Amount and/or Complexity of Data Reviewed  Labs: ordered. Decision-making details documented in ED Course.    Risk  Prescription drug management.                ED Course as of 09/19/24 2246   Thu Sep 19, 2024   2032 Exposure panel - baseline(!)       Medications - No data to display    History of Present Illness       19-year-old female states that she was giving a intramuscular flu shot to the patient at [facility] and then shortly thereafter she accidentally stuck herself in the left index finger.  Patient states that her manager and the pharmacy did question the patient about medical history patient denied any history of HIV/hepatitis or other blood-borne diseases.  They did not get any baseline panel on the source.        Review of Systems   Constitutional:  Negative for activity change, chills, diaphoresis and fever.   HENT:  Negative for congestion, ear pain, nosebleeds, sore throat, trouble swallowing and voice change.    Eyes:  Negative for pain, discharge and redness.   Respiratory:  Negative for apnea, cough, choking, shortness of breath, wheezing and stridor.    Cardiovascular:  Negative for chest pain and palpitations.   Gastrointestinal:  Negative for abdominal distention, abdominal pain, constipation, diarrhea, nausea and vomiting.   Endocrine: Negative for polydipsia.   Genitourinary:  Negative for difficulty urinating, dysuria, flank pain, frequency, hematuria and urgency.   Musculoskeletal:  Negative for back pain, gait problem, joint swelling, myalgias, neck pain and neck  stiffness.   Skin:  Positive for wound. Negative for pallor and rash.   Neurological:  Negative for dizziness, tremors, syncope, speech difficulty, weakness, numbness and headaches.   Hematological:  Negative for adenopathy.   Psychiatric/Behavioral:  Negative for confusion, hallucinations, self-injury and suicidal ideas. The patient is not nervous/anxious.            Objective     ED Triage Vitals [09/19/24 1938]   Temperature Pulse Blood Pressure Respirations SpO2 Patient Position - Orthostatic VS   (!) 97 °F (36.1 °C) 58 104/59 18 100 % Sitting      Temp Source Heart Rate Source BP Location FiO2 (%) Pain Score    Tympanic Monitor Left arm -- No Pain        Physical Exam  Vitals and nursing note reviewed.   Constitutional:       General: She is not in acute distress.     Appearance: She is well-developed. She is not diaphoretic.   HENT:      Head: Normocephalic and atraumatic.      Right Ear: External ear normal.      Left Ear: External ear normal.      Nose: Nose normal.   Eyes:      Conjunctiva/sclera: Conjunctivae normal.      Pupils: Pupils are equal, round, and reactive to light.   Cardiovascular:      Rate and Rhythm: Normal rate and regular rhythm.      Heart sounds: Normal heart sounds.   Pulmonary:      Effort: Pulmonary effort is normal.      Breath sounds: Normal breath sounds.   Abdominal:      General: Bowel sounds are normal.      Palpations: Abdomen is soft.      Tenderness: There is no abdominal tenderness.   Musculoskeletal:         General: Normal range of motion.      Cervical back: Normal range of motion and neck supple.   Skin:     General: Skin is warm and dry.      Findings: Lesion present.      Comments: Small area of left index finger puncture.   Neurological:      Mental Status: She is alert and oriented to person, place, and time.      Deep Tendon Reflexes: Reflexes are normal and symmetric.   Psychiatric:         Behavior: Behavior is cooperative.         Labs Reviewed   ALT - Abnormal        Result Value    ALT 6 (*)    HEPATITIS B SURFACE ANTIGEN   HEPATITIS C ANTIBODY   HIV 1/2 AG/AB W REFLEX [facility] FOR 2 YR OLD AND ABOVE   HEPATITIS B SURFACE ANTIBODY QUANT   EXPOSURE PANEL - BASELINE    Narrative:     The following orders were created for panel order Exposure panel - baseline.  Procedure                               Abnormality         Status                     ---------                               -----------         ------                     Hepatitis B surface antigen[[id number]]                      In process                 Hepatitis C antibody[[id number]]                             In process                 HIV 1/2 AG/AB w Reflex S...[[id number]]                      In process                 ALT[[id number]]                          Abnormal            Final result               Hepatitis B surface anti...[[id number]]                      In process                   Please view results for these tests on the individual orders.     No orders to display       Procedures    ED Medication and Procedure Management   Prior to Admission Medications   Prescriptions Last Dose Informant Patient Reported? Taking?   EPINEPHrine (EPIPEN) 0.3 mg/0.3 mL SOAJ   No No   Sig: Inject 0.3 mL (0.3 mg total) into a muscle once for 1 dose As needed for anaphylactic reaction to nuts   clindamycin (CLEOCIN T) 1 % lotion   No No   Sig: Apply topically 2 (two) times a day   hydroquinone 4 % cream  Self No No   Sig: Apply to face once daily for 3 months, then take 1 month break.   norgestimate-ethinyl estradiol (ORTHO-CYCLEN) 0.25-35 MG-MCG per tablet  Self No No   Sig: Take 1 tablet by mouth daily   Patient not taking: Reported on 2/7/2024      Facility-Administered Medications: None     Discharge Medication List as of 9/19/2024  9:10 PM        CONTINUE these medications which have NOT CHANGED    Details   clindamycin (CLEOCIN T) 1 % lotion Apply topically 2 (two) times a day, Starting Mon 6/3/2024, Normal       EPINEPHrine (EPIPEN) 0.3 mg/0.3 mL SOAJ Inject 0.3 mL (0.3 mg total) into a muscle once for 1 dose As needed for anaphylactic reaction to nuts, Starting Sat 2/26/2022, Normal      hydroquinone 4 % cream Apply to face once daily for 3 months, then take 1 month break., Normal      norgestimate-ethinyl estradiol (ORTHO-CYCLEN) 0.25-35 MG-MCG per tablet Take 1 tablet by mouth daily, Starting Thu 8/31/2023, Normal           No discharge procedures on file.     Pablo Velazquez, DO  09/19/24 2246       Pablo Velazquez, DO  09/19/24 2246

## 2024-09-20 NOTE — TELEPHONE ENCOUNTER
Patient's mom calling stating she was at work yesterday giving flu shots and accidentally stuck herself with a used needle.  She was in th ED and they did do the blood work.  It is going through workmans comp, patient's mom is wondering if Dr. Rutherford wants to review the labs and see her for a follow up- please advise, call mom to schedule appt if necessary

## 2024-09-20 NOTE — TELEPHONE ENCOUNTER
I reviewed the ER note and the blood work done in the ER. No follow up appointment is necessary for this unless she has any new concerns.

## 2024-11-29 ENCOUNTER — TELEPHONE (OUTPATIENT)
Age: 19
End: 2024-11-29

## 2024-11-29 ENCOUNTER — OFFICE VISIT (OUTPATIENT)
Dept: FAMILY MEDICINE CLINIC | Facility: CLINIC | Age: 19
End: 2024-11-29
Payer: COMMERCIAL

## 2024-11-29 ENCOUNTER — HOSPITAL ENCOUNTER (EMERGENCY)
Facility: HOSPITAL | Age: 19
Discharge: HOME | End: 2024-11-29
Payer: COMMERCIAL

## 2024-11-29 VITALS
RESPIRATION RATE: 18 BRPM | HEART RATE: 99 BPM | DIASTOLIC BLOOD PRESSURE: 68 MMHG | OXYGEN SATURATION: 98 % | TEMPERATURE: 98.5 F | SYSTOLIC BLOOD PRESSURE: 118 MMHG

## 2024-11-29 VITALS
WEIGHT: 127.4 LBS | HEART RATE: 60 BPM | SYSTOLIC BLOOD PRESSURE: 120 MMHG | TEMPERATURE: 97.1 F | BODY MASS INDEX: 23.68 KG/M2 | DIASTOLIC BLOOD PRESSURE: 80 MMHG

## 2024-11-29 DIAGNOSIS — S01.332A PIERCED EAR INFECTION, LEFT, INITIAL ENCOUNTER: Primary | ICD-10-CM

## 2024-11-29 DIAGNOSIS — L08.9 PIERCED EAR INFECTION, LEFT, INITIAL ENCOUNTER: Primary | ICD-10-CM

## 2024-11-29 DIAGNOSIS — S01.339A COMPLICATION OF EAR PIERCING, INITIAL ENCOUNTER: Primary | ICD-10-CM

## 2024-11-29 PROCEDURE — 99284 EMERGENCY DEPT VISIT MOD MDM: CPT

## 2024-11-29 PROCEDURE — 99282 EMERGENCY DEPT VISIT SF MDM: CPT

## 2024-11-29 PROCEDURE — 99213 OFFICE O/P EST LOW 20 MIN: CPT | Performed by: FAMILY MEDICINE

## 2024-11-29 RX ORDER — SULFAMETHOXAZOLE AND TRIMETHOPRIM 800; 160 MG/1; MG/1
1 TABLET ORAL 2 TIMES DAILY
Qty: 14 TABLET | Refills: 0 | Status: SHIPPED | OUTPATIENT
Start: 2024-11-29 | End: 2024-12-06

## 2024-11-29 RX ORDER — METHYLPREDNISOLONE 4 MG/1
TABLET ORAL
Qty: 21 EACH | Refills: 0 | Status: SHIPPED | OUTPATIENT
Start: 2024-11-29

## 2024-11-29 RX ORDER — CEPHALEXIN 500 MG/1
500 CAPSULE ORAL EVERY 12 HOURS SCHEDULED
Qty: 20 CAPSULE | Refills: 0 | Status: SHIPPED | OUTPATIENT
Start: 2024-11-29 | End: 2024-12-09

## 2024-11-29 RX ORDER — SULFAMETHOXAZOLE AND TRIMETHOPRIM 800; 160 MG/1; MG/1
1 TABLET ORAL ONCE
Status: COMPLETED | OUTPATIENT
Start: 2024-11-29 | End: 2024-11-29

## 2024-11-29 RX ORDER — LIDOCAINE HYDROCHLORIDE 20 MG/ML
1 JELLY TOPICAL ONCE
Status: COMPLETED | OUTPATIENT
Start: 2024-11-29 | End: 2024-11-29

## 2024-11-29 RX ORDER — MUPIROCIN 20 MG/G
OINTMENT TOPICAL 3 TIMES DAILY
Qty: 50 G | Refills: 0 | Status: SHIPPED | OUTPATIENT
Start: 2024-11-29

## 2024-11-29 RX ORDER — IBUPROFEN 400 MG/1
400 TABLET, FILM COATED ORAL ONCE
Status: COMPLETED | OUTPATIENT
Start: 2024-11-29 | End: 2024-11-29

## 2024-11-29 RX ORDER — LIDOCAINE HYDROCHLORIDE AND EPINEPHRINE 10; 10 MG/ML; UG/ML
1 INJECTION, SOLUTION INFILTRATION; PERINEURAL ONCE
Status: COMPLETED | OUTPATIENT
Start: 2024-11-29 | End: 2024-11-29

## 2024-11-29 RX ADMIN — LIDOCAINE HYDROCHLORIDE 1 APPLICATION: 20 JELLY TOPICAL at 15:42

## 2024-11-29 RX ADMIN — SULFAMETHOXAZOLE AND TRIMETHOPRIM 1 TABLET: 800; 160 TABLET ORAL at 15:40

## 2024-11-29 RX ADMIN — IBUPROFEN 400 MG: 400 TABLET, FILM COATED ORAL at 17:04

## 2024-11-29 RX ADMIN — LIDOCAINE HYDROCHLORIDE,EPINEPHRINE BITARTRATE 1 ML: 10; .01 INJECTION, SOLUTION INFILTRATION; PERINEURAL at 15:40

## 2024-11-29 NOTE — PROGRESS NOTES
Assessment/Plan:    1. Complication of ear piercing, initial encounter  -     cephalexin (KEFLEX) 500 mg capsule; Take 1 capsule (500 mg total) by mouth every 12 (twelve) hours for 10 days  -     methylPREDNISolone 4 MG tablet therapy pack; Use as directed on package  -     Ambulatory Referral to Otolaryngology; Future  -     mupirocin (BACTROBAN) 2 % ointment; Apply topically 3 (three) times a day      Pt started haviung problems a number of days ago  At this point it is draining swollen red and tender.   The piercing is below the skin  Call out to ent to see if she can be seen  I attempted to remove the piercing - the pt was in to much pain to perform the procedure here  Abx given  Steroids given    There are no Patient Instructions on file for this visit.    No follow-ups on file.    Subjective:      Patient ID: Sixto Noland is a 19 y.o. female.    Chief Complaint   Patient presents with    Wound Infection     Piercing infected  [id number]       Pt sched for same day appt for infection at ear piercing site    Piercing done 6 days ago  It is swollen and red  Started three days after        The following portions of the patient's history were reviewed and updated as appropriate: allergies, current medications, past family history, past medical history, past social history, past surgical history and problem list.    Review of Systems   Skin:         Swollen red left ear         Current Outpatient Medications   Medication Sig Dispense Refill    cephalexin (KEFLEX) 500 mg capsule Take 1 capsule (500 mg total) by mouth every 12 (twelve) hours for 10 days 20 capsule 0    methylPREDNISolone 4 MG tablet therapy pack Use as directed on package 21 each 0    mupirocin (BACTROBAN) 2 % ointment Apply topically 3 (three) times a day 50 g 0    clindamycin (CLEOCIN T) 1 % lotion Apply topically 2 (two) times a day (Patient not taking: Reported on 11/29/2024) 60 mL 2    emtricitabine-tenofovir (TRUVADA) 200-300 mg per tablet Take 1  tablet by mouth daily (Patient not taking: Reported on 11/29/2024) 28 tablet 0    EPINEPHrine (EPIPEN) 0.3 mg/0.3 mL SOAJ Inject 0.3 mL (0.3 mg total) into a muscle once for 1 dose As needed for anaphylactic reaction to nuts 0.3 mL 0    hydroquinone 4 % cream Apply to face once daily for 3 months, then take 1 month break. (Patient not taking: Reported on 11/29/2024) 30 g 3    norgestimate-ethinyl estradiol (ORTHO-CYCLEN) 0.25-35 MG-MCG per tablet Take 1 tablet by mouth daily (Patient not taking: Reported on 2/7/2024) 168 tablet 1    raltegravir (ISENTRESS) 400 mg tablet Take 1 tablet (400 mg total) by mouth every 12 (twelve) hours (Patient not taking: Reported on 11/29/2024) 56 tablet 0     No current facility-administered medications for this visit.       Objective:    /80   Pulse 60   Temp (!) 97.1 °F (36.2 °C)   Wt 57.8 kg (127 lb 6.4 oz)   LMP  (LMP Unknown)   BMI 23.68 kg/m²        Physical Exam  Skin:     Comments: Swollen draining pinna left ear  Piercing is below the skin left ear  Warm  Extremely tender                Frank Lombardi, DO

## 2024-11-29 NOTE — TELEPHONE ENCOUNTER
Pt's father called in to schedule an asap appt for pt. Father states pt was referred by Dr. Lombardi. [facility] phone # was offered for a sooner appt. The father was not able to take the # at the moment and kindly requested a call back if there's any chance to get the pt in by the next week.

## 2024-11-29 NOTE — TELEPHONE ENCOUNTER
Patient calling on ENT line with referral from PCP for infected helix ear piercing.  Advised that soonest available was 1/23 but I could put her on wait list.  Patient was given antibiotics and will finish the 10day course. She explained that the site is red and inflamed with some crust.     Per recommendation from Dr Lombardi, patient may have more success in ED if she cannot wait until ENT appointment.     Patient made aware and will utilize ED.  I advised her to return call if ED wanted her to follow with ENT    CB#[phone]

## 2024-11-29 NOTE — TELEPHONE ENCOUNTER
Patient called in to see if there was a same day appointment available. Advised caller that we do not have a provider for same day appointment. Attempted to contact [facility] but the office was closed. Caller declined nurse triage and stated they will bring their daughter to the ED instead He hung up the phone.

## 2024-11-29 NOTE — DISCHARGE INSTRUCTIONS
Please take the antibiotics as prescribed for 1 week. You can wash the area with soap and water. Please continue to massage to get any extra infection to drain out.     I have put in a referral to ENT for you to follow up with if the infection is not resolving over the next week.

## 2024-11-29 NOTE — TELEPHONE ENCOUNTER
Patient just seen at office Referred to ENT ENT can not see patient until 3 weeks Would you recommend patient go to ER to have earring   removed Office unavailable at time of call Please advise

## 2024-11-29 NOTE — TELEPHONE ENCOUNTER
Honestly, I wils I was able to do it here, but yes I think she should go to the ed they may have better luck than me

## 2024-11-29 NOTE — ED PROVIDER NOTES
ED Disposition       None          Assessment & Plan   {Hyperlinks  Risk Stratification - NIHSS - HEART SCORE - Fill out sepsis note and make sure you call [phone] if severe or septic shock:[id number]}    Medical Decision Making  19 y.o female present emergency department due to embedded earring with signs of local infection.  Earring was removed as described.  Patient was placed on oral antibiotics and recommended to continue these to completion, do not replace another earring into this site, allow it to naturally close and continued to heal before undergoing re-piercing in the future. Patient otherwise appropriate for home management and PCP vs ENT f/u as needed for reevaluation and management.       Risk  Prescription drug management.             Medications   sulfamethoxazole-trimethoprim (BACTRIM DS) 800-160 mg per tablet 1 tablet (has no administration in time range)   lidocaine (URO-JET) 2 % urethral/mucosal gel 1 Application (has no administration in time range)   lidocaine-epinephrine (XYLOCAINE/EPINEPHRINE) 1 %-1:100,000 injection 1 mL (has no administration in time range)       ED Risk Strat Scores                           SBIRT 22yo+      Flowsheet Row Most Recent Value   Initial Alcohol Screen: US AUDIT-C     1. How often do you have a drink containing alcohol? 0 Filed at: 11/29/2024 1530   2. How many drinks containing alcohol do you have on a typical day you are drinking?  0 Filed at: 11/29/2024 1530   3a. Male UNDER 65: How often do you have five or more drinks on one occasion? 0 Filed at: 11/29/2024 1530   3b. FEMALE Any Age, or MALE 65+: How often do you have 4 or more drinks on one occassion? 0 Filed at: 11/29/2024 1530   Audit-C Score 0 Filed at: 11/29/2024 1530   CHICO: How many times in the past year have you...    Used an illegal drug or used a prescription medication for non-medical reasons? Never Filed at: 11/29/2024 1530                            History of Present Illness   {Hyperlinks   History (Med, Surg, Fam, Social) - Current Medications - Allergies  :[id number]}    Chief Complaint   Patient presents with    Ear Problem     Patient had piercing to left ear done on Saturday, then on Tuesday noticed ear was swollen and painful, went to Dr.Lombardi today and he could not remove earring, was advised to come to the ER, attempted to be seen by ENT but no available appointments today or anytime soon,ear is swollen and earring still in ear,redness noted as well       Past Medical History:   Diagnosis Date    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, codeine    No known health problems       Past Surgical History:   Procedure Laterality Date    NO PAST SURGERIES        Family History   Problem Relation Age of Onset    No Known Problems Mother     Hypothyroidism Father         acquired    Hyperlipidemia Father     Diabetes Maternal Grandmother     Stroke Maternal Grandfather     Heart disease Paternal Grandfather     Diabetes Maternal Aunt     Diabetes Maternal Uncle     Thyroid cancer Family       Social History     Tobacco Use    Smoking status: Never    Smokeless tobacco: Never   Vaping Use    Vaping status: Never Used   Substance Use Topics    Alcohol use: Never    Drug use: Never      E-Cigarette/Vaping    E-Cigarette Use Never User       E-Cigarette/Vaping Substances    Nicotine No     THC No     CBD No     Flavoring No       I have reviewed and agree with the history as documented.     HPI    Review of Systems        Objective   {Hyperlinks  Historical Vitals - Historical Labs - Chart Review/Microbiology - Last Echo - Code Status  :[id number]}    ED Triage Vitals [11/29/24 1526]   Temperature Pulse Blood Pressure Respirations SpO2 Patient Position - Orthostatic VS   98.5 °F (36.9 °C) 104 153/71 16 98 % --      Temp Source Heart Rate Source BP Location FiO2 (%) Pain Score    Oral Monitor -- -- --      Vitals      Date and Time Temp Pulse SpO2 Resp BP Pain Score FACES Pain Rating User   11/29/24 1526 98.5  °F (36.9 °C) 104 98 % 16 153/71 -- -- [facility]            Physical Exam    Results Reviewed       None            No orders to display       Procedures    ED Medication and Procedure Management   Prior to Admission Medications   Prescriptions Last Dose Informant Patient Reported? Taking?   EPINEPHrine (EPIPEN) 0.3 mg/0.3 mL SOAJ   No No   Sig: Inject 0.3 mL (0.3 mg total) into a muscle once for 1 dose As needed for anaphylactic reaction to nuts   cephalexin (KEFLEX) 500 mg capsule   No No   Sig: Take 1 capsule (500 mg total) by mouth every 12 (twelve) hours for 10 days   clindamycin (CLEOCIN T) 1 % lotion   No No   Sig: Apply topically 2 (two) times a day   Patient not taking: Reported on 11/29/2024   emtricitabine-tenofovir (TRUVADA) 200-300 mg per tablet   No No   Sig: Take 1 tablet by mouth daily   Patient not taking: Reported on 11/29/2024   hydroquinone 4 % cream  Self No No   Sig: Apply to face once daily for 3 months, then take 1 month break.   Patient not taking: Reported on 11/29/2024   methylPREDNISolone 4 MG tablet therapy pack   No No   Sig: Use as directed on package   mupirocin (BACTROBAN) 2 % ointment   No No   Sig: Apply topically 3 (three) times a day   norgestimate-ethinyl estradiol (ORTHO-CYCLEN) 0.25-35 MG-MCG per tablet  Self No No   Sig: Take 1 tablet by mouth daily   Patient not taking: Reported on 2/7/2024   raltegravir (ISENTRESS) 400 mg tablet   No No   Sig: Take 1 tablet (400 mg total) by mouth every 12 (twelve) hours   Patient not taking: Reported on 11/29/2024      Facility-Administered Medications: None     Patient's Medications   Discharge Prescriptions    No medications on file     No discharge procedures on file.  ED SEPSIS DOCUMENTATION          removal and poor cosmetic result    Alternatives discussed:  No treatment  Universal protocol:     Procedure explained and questions answered to patient or proxy's satisfaction: yes      Patient identity confirmed:  Verbally with patient  Location:     Location:  Ear    Ear location:  L ear    Depth:  Intradermal    Tendon involvement:  None  Pre-procedure details:     Imaging:  None    Neurovascular status: intact      Preparation: Patient was prepped and draped in usual sterile fashion    Anesthesia (see MAR for exact dosages):     Anesthesia method:  Local infiltration and topical application    Topical anesthetic:  Lidocaine gel    Local anesthetic:  Lidocaine 1% WITH epi  Procedure details:     Localization method:  Visualized    Removal mechanism:  Alligator forceps    Removal Method:  Percutaneous    Procedure complexity:  Simple    Foreign bodies recovered:  1    Description:  Intact earring    Intact foreign body removal: yes    Post-procedure details:     Neurovascular status: intact      Confirmation:  No additional foreign bodies on visualization    Skin closure:  None    Dressing:  Non-adherent dressing    Patient tolerance of procedure:  Tolerated well, no immediate complications      ED Medication and Procedure Management   Prior to Admission Medications   Prescriptions Last Dose Informant Patient Reported? Taking?   EPINEPHrine (EPIPEN) 0.3 mg/0.3 mL SOAJ   No No   Sig: Inject 0.3 mL (0.3 mg total) into a muscle once for 1 dose As needed for anaphylactic reaction to nuts   cephalexin (KEFLEX) 500 mg capsule   No No   Sig: Take 1 capsule (500 mg total) by mouth every 12 (twelve) hours for 10 days   clindamycin (CLEOCIN T) 1 % lotion   No No   Sig: Apply topically 2 (two) times a day   Patient not taking: Reported on 11/29/2024   emtricitabine-tenofovir (TRUVADA) 200-300 mg per tablet   No No   Sig: Take 1 tablet by mouth daily   Patient not taking: Reported on 11/29/2024   hydroquinone 4 % cream  Self  No No   Sig: Apply to face once daily for 3 months, then take 1 month break.   Patient not taking: Reported on 11/29/2024   methylPREDNISolone 4 MG tablet therapy pack   No No   Sig: Use as directed on package   mupirocin (BACTROBAN) 2 % ointment   No No   Sig: Apply topically 3 (three) times a day   norgestimate-ethinyl estradiol (ORTHO-CYCLEN) 0.25-35 MG-MCG per tablet  Self No No   Sig: Take 1 tablet by mouth daily   Patient not taking: Reported on 2/7/2024   raltegravir (ISENTRESS) 400 mg tablet   No No   Sig: Take 1 tablet (400 mg total) by mouth every 12 (twelve) hours   Patient not taking: Reported on 11/29/2024      Facility-Administered Medications: None     Discharge Medication List as of 11/29/2024  4:58 PM        START taking these medications    Details   sulfamethoxazole-trimethoprim (BACTRIM DS) 800-160 mg per tablet Take 1 tablet by mouth 2 (two) times a day for 7 days smx-tmp DS (BACTRIM) 800-160 mg tabs (1tab q12 D10), Starting Fri 11/29/2024, Until Fri 12/6/2024, Normal           CONTINUE these medications which have NOT CHANGED    Details   cephalexin (KEFLEX) 500 mg capsule Take 1 capsule (500 mg total) by mouth every 12 (twelve) hours for 10 days, Starting Fri 11/29/2024, Until Mon 12/9/2024, Normal      clindamycin (CLEOCIN T) 1 % lotion Apply topically 2 (two) times a day, Starting Mon 6/3/2024, Normal      emtricitabine-tenofovir (TRUVADA) 200-300 mg per tablet Take 1 tablet by mouth daily, Starting Thu 9/19/2024, Normal      EPINEPHrine (EPIPEN) 0.3 mg/0.3 mL SOAJ Inject 0.3 mL (0.3 mg total) into a muscle once for 1 dose As needed for anaphylactic reaction to nuts, Starting Sat 2/26/2022, Normal      hydroquinone 4 % cream Apply to face once daily for 3 months, then take 1 month break., Normal      methylPREDNISolone 4 MG tablet therapy pack Use as directed on package, Normal      mupirocin (BACTROBAN) 2 % ointment Apply topically 3 (three) times a day, Starting Fri 11/29/2024, Normal       norgestimate-ethinyl estradiol (ORTHO-CYCLEN) 0.25-35 MG-MCG per tablet Take 1 tablet by mouth daily, Starting Thu 8/31/2023, Normal      raltegravir (ISENTRESS) 400 mg tablet Take 1 tablet (400 mg total) by mouth every 12 (twelve) hours, Starting Thu 9/19/2024, Normal             ED SEPSIS DOCUMENTATION   Time reflects when diagnosis was documented in both MDM as applicable and the Disposition within this note       Time User Action Codes Description Comment    11/29/2024  4:56 PM Fortino Marr Add [S01.332A,  L08.9] Pierced ear infection, left, initial encounter                  Fortino Marr MD  12/03/24 0364

## 2024-12-08 DIAGNOSIS — Z91.018 ALLERGY TO NUTS: ICD-10-CM

## 2024-12-10 RX ORDER — EPINEPHRINE 0.3 MG/.3ML
0.3 INJECTION SUBCUTANEOUS ONCE
Qty: 0.3 ML | Refills: 0 | Status: SHIPPED | OUTPATIENT
Start: 2024-12-10 | End: 2024-12-10

## 2024-12-24 ENCOUNTER — OFFICE VISIT (OUTPATIENT)
Dept: FAMILY MEDICINE CLINIC | Facility: CLINIC | Age: 19
End: 2024-12-24
Payer: COMMERCIAL

## 2024-12-24 VITALS
DIASTOLIC BLOOD PRESSURE: 70 MMHG | TEMPERATURE: 96.6 F | WEIGHT: 128.6 LBS | HEART RATE: 64 BPM | HEIGHT: 62 IN | SYSTOLIC BLOOD PRESSURE: 108 MMHG | BODY MASS INDEX: 23.66 KG/M2

## 2024-12-24 DIAGNOSIS — Z23 NEED FOR VACCINATION: ICD-10-CM

## 2024-12-24 DIAGNOSIS — Z00.00 WELL ADULT EXAM: Primary | ICD-10-CM

## 2024-12-24 DIAGNOSIS — E78.5 HYPERLIPIDEMIA, UNSPECIFIED HYPERLIPIDEMIA TYPE: ICD-10-CM

## 2024-12-24 DIAGNOSIS — E28.2 PCOS (POLYCYSTIC OVARIAN SYNDROME): ICD-10-CM

## 2024-12-24 PROCEDURE — 90471 IMMUNIZATION ADMIN: CPT

## 2024-12-24 PROCEDURE — 90715 TDAP VACCINE 7 YRS/> IM: CPT

## 2024-12-24 PROCEDURE — 99395 PREV VISIT EST AGE 18-39: CPT | Performed by: FAMILY MEDICINE

## 2024-12-24 NOTE — ASSESSMENT & PLAN NOTE
Was started on OCP by GYN last year, but she does not want to continue it. Would like to treat naturally.   Orders:    Hemoglobin A1c (w/out EAG); Future

## 2024-12-24 NOTE — PROGRESS NOTES
"Adult Annual Physical  Name: Sixto Noland      : 2005      MRN: [id number]  Encounter Provider: Teri Rutherford MD  Encounter Date: 2024   Encounter department: [facility]    Assessment & Plan  Well adult exam         Hyperlipidemia, unspecified hyperlipidemia type    Orders:    Comprehensive metabolic panel; Future    Lipid Panel with Direct LDL reflex; Future    PCOS (polycystic ovarian syndrome)  Was started on OCP by GYN last year, but she does not want to continue it. Would like to treat naturally.   Orders:    Hemoglobin A1c (w/out EAG); Future    Need for vaccination    Orders:    TDAP VACCINE GREATER THAN OR EQUAL TO 6YO IM    Immunizations and preventive care screenings were discussed with patient today. Appropriate education was printed on patient's after visit summary.           History of Present Illness     Adult Annual Physical:  Patient presents for annual physical.     Diet and Physical Activity:  - Diet/Nutrition: well balanced diet.  - Exercise: no formal exercise.    General Health:  - Sleep: sleeps well.  - Hearing: decreased hearing bilateral ears. due to earwax  - Vision: no vision problems.  - Dental: regular dental visits.    /GYN Health:  - Follows with GYN: no.     Review of Systems   Constitutional: Negative.    HENT: Negative.     Eyes: Negative.    Respiratory: Negative.     Cardiovascular: Negative.    Gastrointestinal: Negative.    Endocrine: Negative.    Genitourinary: Negative.    Musculoskeletal: Negative.    Skin: Negative.    Allergic/Immunologic: Negative.    Neurological: Negative.    Hematological: Negative.    Psychiatric/Behavioral: Negative.           Objective   /70   Pulse 64   Temp (!) 96.6 °F (35.9 °C)   Ht 5' 1.81\" (1.57 m)   Wt 58.3 kg (128 lb 9.6 oz)   LMP 10/14/2024 (Exact Date)   BMI 23.67 kg/m²     Physical Exam  Vitals and nursing note reviewed.   Constitutional:       General: She is not in acute distress.     " Appearance: She is well-developed.   HENT:      Head: Normocephalic and atraumatic.      Right Ear: Tympanic membrane, ear canal and external ear normal. There is no impacted cerumen.      Left Ear: Tympanic membrane, ear canal and external ear normal. There is no impacted cerumen.      Nose: Nose normal.      Mouth/Throat:      Mouth: Mucous membranes are moist.   Eyes:      Conjunctiva/sclera: Conjunctivae normal.   Cardiovascular:      Rate and Rhythm: Normal rate and regular rhythm.      Heart sounds: No murmur heard.  Pulmonary:      Effort: Pulmonary effort is normal. No respiratory distress.      Breath sounds: Normal breath sounds.   Abdominal:      General: Abdomen is flat. There is no distension.      Palpations: Abdomen is soft. There is no mass.      Tenderness: There is no abdominal tenderness. There is no guarding or rebound.      Hernia: No hernia is present.   Musculoskeletal:         General: Normal range of motion.      Cervical back: Neck supple.   Skin:     General: Skin is warm and dry.   Neurological:      General: No focal deficit present.      Mental Status: She is alert and oriented to person, place, and time.

## 2025-02-25 LAB
ALBUMIN SERPL-MCNC: 4.7 G/DL (ref 4–5)
ALP SERPL-CCNC: 134 IU/L (ref 44–121)
ALT SERPL-CCNC: 5 IU/L (ref 0–32)
AST SERPL-CCNC: 19 IU/L (ref 0–40)
BILIRUB SERPL-MCNC: 0.2 MG/DL (ref 0–1.2)
BUN SERPL-MCNC: 10 MG/DL (ref 6–20)
BUN/CREAT SERPL: 15 (ref 9–23)
CALCIUM SERPL-MCNC: 9.9 MG/DL (ref 8.7–10.2)
CHLORIDE SERPL-SCNC: 102 MMOL/L (ref 96–106)
CHOLEST SERPL-MCNC: 192 MG/DL (ref 100–199)
CO2 SERPL-SCNC: 24 MMOL/L (ref 20–29)
CREAT SERPL-MCNC: 0.65 MG/DL (ref 0.57–1)
EGFR: 128 ML/MIN/1.73
GLOBULIN SER-MCNC: 3 G/DL (ref 1.5–4.5)
GLUCOSE SERPL-MCNC: 81 MG/DL (ref 70–99)
HBA1C MFR BLD: 5.8 % (ref 4.8–5.6)
HDLC SERPL-MCNC: 60 MG/DL
LDLC SERPL CALC-MCNC: 118 MG/DL (ref 0–99)
LDLC/HDLC SERPL: 2 RATIO (ref 0–3.2)
MICRODELETION SYND BLD/T FISH: NORMAL
POTASSIUM SERPL-SCNC: 4.3 MMOL/L (ref 3.5–5.2)
PROT SERPL-MCNC: 7.7 G/DL (ref 6–8.5)
SL AMB VLDL CHOLESTEROL CALC: 14 MG/DL (ref 5–40)
SODIUM SERPL-SCNC: 140 MMOL/L (ref 134–144)
TRIGL SERPL-MCNC: 77 MG/DL (ref 0–149)

## 2025-02-26 ENCOUNTER — RESULTS FOLLOW-UP (OUTPATIENT)
Dept: FAMILY MEDICINE CLINIC | Facility: CLINIC | Age: 20
End: 2025-02-26

## 2025-05-16 ENCOUNTER — TELEPHONE (OUTPATIENT)
Age: 20
End: 2025-05-16

## 2025-05-16 NOTE — TELEPHONE ENCOUNTER
Patient called needing Vaccine information.  Helped patient find her vaccines on my chart.  No further questions at this time.

## 2025-05-28 DIAGNOSIS — E55.9 VITAMIN D DEFICIENCY: ICD-10-CM

## 2025-05-28 DIAGNOSIS — R73.03 PREDIABETES: ICD-10-CM

## 2025-05-28 DIAGNOSIS — E01.0 THYROMEGALY: ICD-10-CM

## 2025-05-28 DIAGNOSIS — E78.5 HYPERLIPIDEMIA, UNSPECIFIED HYPERLIPIDEMIA TYPE: Primary | ICD-10-CM

## 2025-05-28 DIAGNOSIS — Z13.0 SCREENING FOR DEFICIENCY ANEMIA: ICD-10-CM

## 2025-05-29 ENCOUNTER — OFFICE VISIT (OUTPATIENT)
Dept: DERMATOLOGY | Facility: CLINIC | Age: 20
End: 2025-05-29
Payer: COMMERCIAL

## 2025-05-29 VITALS — TEMPERATURE: 98 F | HEIGHT: 62 IN | WEIGHT: 128 LBS | BODY MASS INDEX: 23.55 KG/M2

## 2025-05-29 DIAGNOSIS — L21.9 SEBORRHEIC DERMATITIS: Primary | ICD-10-CM

## 2025-05-29 PROCEDURE — 99214 OFFICE O/P EST MOD 30 MIN: CPT | Performed by: NURSE PRACTITIONER

## 2025-05-29 RX ORDER — KETOCONAZOLE 20 MG/ML
SHAMPOO, SUSPENSION TOPICAL
Qty: 120 ML | Refills: 6 | Status: SHIPPED | OUTPATIENT
Start: 2025-05-29

## 2025-05-29 RX ORDER — KETOCONAZOLE 20 MG/G
CREAM TOPICAL
Qty: 60 G | Refills: 2 | Status: SHIPPED | OUTPATIENT
Start: 2025-05-29

## 2025-05-29 NOTE — PROGRESS NOTES
"[facility] Dermatology Clinic Note     Patient Name: Sixto Noland  Encounter Date: 5/29/2025       Have you been cared for by a [facility] Dermatologist in the last 3 years and, if so, which description applies to you? Yes. I have been here within the last 3 years, and my medical history has NOT changed since that time. I am of child-bearing potential.     REVIEW OF SYSTEMS:  Have you recently had or currently have any of the following? No changes in my recent health.   PAST MEDICAL HISTORY:  Have you personally ever had or currently have any of the following?  If \"YES,\" then please provide more detail. No changes in my medical history.   HISTORY OF IMMUNOSUPPRESSION: Do you have a history of any of the following:  Systemic Immunosuppression such as Diabetes, Biologic or Immunotherapy, Chemotherapy, Organ Transplantation, Bone Marrow Transplantation or Prednisone?  No     Answering \"YES\" requires the addition of the dotphrase \"IMMUNOSUPPRESSED\" as the first diagnosis of the patient's visit.   FAMILY HISTORY:  Any \"first degree relatives\" (parent, brother, sister, or child) with the following?    No changes in my family's known health.   PATIENT EXPERIENCE:    Do you want the Dermatologist to perform a COMPLETE skin exam today including a clinical examination under the \"bra and underwear\" areas?  NO  If necessary, do we have your permission to call and leave a detailed message on your Preferred Phone number that includes your specific medical information?  Yes      Allergies[1] Current Medications[2]        Whom besides the patient is providing clinical information about today's encounter?   NO ADDITIONAL HISTORIAN (patient alone provided history) Mother present    Physical Exam and Assessment/Plan by Diagnosis:      SEBORRHEIC DERMATITIS    Physical Exam:  Anatomic Location: scalp and ears  Morphologic Description:  Erythematous plaques with greasy scale  Pertinent Positives:  Pertinent Negatives:    Additional " History of Present Condition:  Patient last evaluated by Dr. Perera on 8/5/24.  She presents today with mother.  Patient had been using Ketoconazole shampoo, but ran out of prescription.  She has continued Head and Shoulders shampoo.  Patient notes flaky, itchy, scaling scalp with increased thinning of hair.  Also present on exam is dry scale behind ears.    Plan:  Ketoconazole 2% shampoo: Apply to affected area daily for 5 to 10 minutes, then rinse clear. Use consistently for 2 weeks, then 2-3x/week as maintenance, can be used as a body wash for shoulders and back   Topical antifungal: Ketoconazole 2% cream . Apply 1-2 times daily to affected area behind ears.  Follow up in 1 year  Advised to call sooner with any questions/concerns    Medical Complexity:    SELF-LIMITED OR MINOR PROBLEM.  Problem runs a definite and prescribed course, is transient in nature, and is not likely to permanently alter health status.       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

## 2025-06-24 LAB
25(OH)D3+25(OH)D2 SERPL-MCNC: 21.6 NG/ML (ref 30–100)
ALBUMIN SERPL-MCNC: 4.5 G/DL (ref 4–5)
ALP SERPL-CCNC: 107 IU/L (ref 44–121)
ALT SERPL-CCNC: 8 IU/L (ref 0–32)
AST SERPL-CCNC: 20 IU/L (ref 0–40)
BASOPHILS # BLD AUTO: 0 X10E3/UL (ref 0–0.2)
BASOPHILS NFR BLD AUTO: 1 %
BILIRUB SERPL-MCNC: <0.2 MG/DL (ref 0–1.2)
BUN SERPL-MCNC: 8 MG/DL (ref 6–20)
BUN/CREAT SERPL: 11 (ref 9–23)
CALCIUM SERPL-MCNC: 9.3 MG/DL (ref 8.7–10.2)
CHLORIDE SERPL-SCNC: 105 MMOL/L (ref 96–106)
CHOLEST SERPL-MCNC: 208 MG/DL (ref 100–199)
CO2 SERPL-SCNC: 19 MMOL/L (ref 20–29)
CREAT SERPL-MCNC: 0.7 MG/DL (ref 0.57–1)
EGFR: 126 ML/MIN/1.73
EOSINOPHIL # BLD AUTO: 0.1 X10E3/UL (ref 0–0.4)
EOSINOPHIL NFR BLD AUTO: 2 %
ERYTHROCYTE [DISTWIDTH] IN BLOOD BY AUTOMATED COUNT: 13.1 % (ref 11.7–15.4)
EST. AVERAGE GLUCOSE BLD GHB EST-MCNC: 111 MG/DL
FERRITIN SERPL-MCNC: 16 NG/ML (ref 15–150)
GLOBULIN SER-MCNC: 2.7 G/DL (ref 1.5–4.5)
GLUCOSE SERPL-MCNC: 88 MG/DL (ref 70–99)
HBA1C MFR BLD: 5.5 % (ref 4.8–5.6)
HCT VFR BLD AUTO: 34.8 % (ref 34–46.6)
HDLC SERPL-MCNC: 55 MG/DL
HGB BLD-MCNC: 11.2 G/DL (ref 11.1–15.9)
IMM GRANULOCYTES # BLD: 0 X10E3/UL (ref 0–0.1)
IMM GRANULOCYTES NFR BLD: 0 %
IRON SATN MFR SERPL: 14 % (ref 15–55)
IRON SERPL-MCNC: 50 UG/DL (ref 27–159)
LDLC SERPL CALC-MCNC: 131 MG/DL (ref 0–99)
LDLC/HDLC SERPL: 2.4 RATIO (ref 0–3.2)
LYMPHOCYTES # BLD AUTO: 1.9 X10E3/UL (ref 0.7–3.1)
LYMPHOCYTES NFR BLD AUTO: 32 %
MCH RBC QN AUTO: 28.7 PG (ref 26.6–33)
MCHC RBC AUTO-ENTMCNC: 32.2 G/DL (ref 31.5–35.7)
MCV RBC AUTO: 89 FL (ref 79–97)
MICRODELETION SYND BLD/T FISH: NORMAL
MONOCYTES # BLD AUTO: 0.4 X10E3/UL (ref 0.1–0.9)
MONOCYTES NFR BLD AUTO: 7 %
NEUTROPHILS # BLD AUTO: 3.5 X10E3/UL (ref 1.4–7)
NEUTROPHILS NFR BLD AUTO: 58 %
PLATELET # BLD AUTO: 286 X10E3/UL (ref 150–450)
POTASSIUM SERPL-SCNC: 4.3 MMOL/L (ref 3.5–5.2)
PROT SERPL-MCNC: 7.2 G/DL (ref 6–8.5)
RBC # BLD AUTO: 3.9 X10E6/UL (ref 3.77–5.28)
SL AMB VLDL CHOLESTEROL CALC: 22 MG/DL (ref 5–40)
SODIUM SERPL-SCNC: 140 MMOL/L (ref 134–144)
T4 FREE SERPL-MCNC: 1.24 NG/DL (ref 0.82–1.77)
TIBC SERPL-MCNC: 346 UG/DL (ref 250–450)
TRIGL SERPL-MCNC: 123 MG/DL (ref 0–149)
TSH SERPL DL<=0.005 MIU/L-ACNC: 1.67 UIU/ML (ref 0.45–4.5)
UIBC SERPL-MCNC: 296 UG/DL (ref 131–425)
WBC # BLD AUTO: 6 X10E3/UL (ref 3.4–10.8)

## 2025-07-09 NOTE — PATIENT INSTRUCTIONS
SEBORRHEIC DERMATITIS      Plan:  Ketoconazole 2% shampoo: Apply to affected area daily for 5 to 10 minutes, then rinse clear. Use consistently for 2-3 weeks then use 2- 3 times a week. can be used as a body wash for shoulders and back   Topical antifungal: Ketoconazole 2% cream . Apply 1-2 times daily.    Medical Complexity:    SELF-LIMITED OR MINOR PROBLEM.  Problem runs a definite and prescribed course, is transient in nature, and is not likely to permanently alter health status.      No